# Patient Record
Sex: MALE | Race: OTHER | HISPANIC OR LATINO | URBAN - METROPOLITAN AREA
[De-identification: names, ages, dates, MRNs, and addresses within clinical notes are randomized per-mention and may not be internally consistent; named-entity substitution may affect disease eponyms.]

---

## 2017-09-13 ENCOUNTER — OUTPATIENT (OUTPATIENT)
Dept: OUTPATIENT SERVICES | Facility: HOSPITAL | Age: 47
LOS: 1 days | End: 2017-09-13
Payer: SELF-PAY

## 2017-09-13 ENCOUNTER — APPOINTMENT (OUTPATIENT)
Dept: FAMILY MEDICINE | Facility: HOSPITAL | Age: 47
End: 2017-09-13

## 2017-09-13 ENCOUNTER — MED ADMIN CHARGE (OUTPATIENT)
Age: 47
End: 2017-09-13

## 2017-09-13 VITALS
WEIGHT: 174 LBS | BODY MASS INDEX: 27.25 KG/M2 | HEART RATE: 63 BPM | RESPIRATION RATE: 16 BRPM | SYSTOLIC BLOOD PRESSURE: 133 MMHG | DIASTOLIC BLOOD PRESSURE: 88 MMHG | OXYGEN SATURATION: 97 % | TEMPERATURE: 97 F

## 2017-09-13 PROCEDURE — G0463: CPT

## 2017-09-19 ENCOUNTER — OUTPATIENT (OUTPATIENT)
Dept: OUTPATIENT SERVICES | Facility: HOSPITAL | Age: 47
LOS: 1 days | End: 2017-09-19
Payer: SELF-PAY

## 2017-09-19 PROCEDURE — 80061 LIPID PANEL: CPT

## 2017-09-19 PROCEDURE — 80053 COMPREHEN METABOLIC PANEL: CPT

## 2017-09-19 PROCEDURE — 36415 COLL VENOUS BLD VENIPUNCTURE: CPT

## 2017-09-19 PROCEDURE — 85025 COMPLETE CBC W/AUTO DIFF WBC: CPT

## 2017-09-19 PROCEDURE — 83036 HEMOGLOBIN GLYCOSYLATED A1C: CPT

## 2017-09-19 PROCEDURE — 84439 ASSAY OF FREE THYROXINE: CPT

## 2017-09-19 PROCEDURE — 84443 ASSAY THYROID STIM HORMONE: CPT

## 2017-10-20 ENCOUNTER — OUTPATIENT (OUTPATIENT)
Dept: OUTPATIENT SERVICES | Facility: HOSPITAL | Age: 47
LOS: 1 days | End: 2017-10-20
Payer: SELF-PAY

## 2017-10-20 ENCOUNTER — APPOINTMENT (OUTPATIENT)
Dept: FAMILY MEDICINE | Facility: HOSPITAL | Age: 47
End: 2017-10-20

## 2017-10-20 VITALS
HEART RATE: 89 BPM | DIASTOLIC BLOOD PRESSURE: 87 MMHG | RESPIRATION RATE: 16 BRPM | OXYGEN SATURATION: 97 % | SYSTOLIC BLOOD PRESSURE: 129 MMHG | WEIGHT: 183 LBS | TEMPERATURE: 98.5 F | BODY MASS INDEX: 28.66 KG/M2

## 2017-10-20 PROCEDURE — G0463: CPT

## 2018-01-11 ENCOUNTER — OUTPATIENT (OUTPATIENT)
Dept: OUTPATIENT SERVICES | Facility: HOSPITAL | Age: 48
LOS: 1 days | End: 2018-01-11
Payer: SELF-PAY

## 2018-01-11 ENCOUNTER — APPOINTMENT (OUTPATIENT)
Dept: FAMILY MEDICINE | Facility: HOSPITAL | Age: 48
End: 2018-01-11

## 2018-01-11 VITALS
HEART RATE: 72 BPM | SYSTOLIC BLOOD PRESSURE: 130 MMHG | HEIGHT: 67 IN | OXYGEN SATURATION: 97 % | WEIGHT: 180 LBS | DIASTOLIC BLOOD PRESSURE: 84 MMHG | TEMPERATURE: 98.1 F | BODY MASS INDEX: 28.25 KG/M2 | RESPIRATION RATE: 16 BRPM

## 2018-01-11 DIAGNOSIS — Z00.00 ENCOUNTER FOR GENERAL ADULT MEDICAL EXAMINATION WITHOUT ABNORMAL FINDINGS: ICD-10-CM

## 2018-01-11 PROCEDURE — G0463: CPT

## 2018-01-12 DIAGNOSIS — R19.6 HALITOSIS: ICD-10-CM

## 2018-03-13 ENCOUNTER — OUTPATIENT (OUTPATIENT)
Dept: OUTPATIENT SERVICES | Facility: HOSPITAL | Age: 48
LOS: 1 days | End: 2018-03-13
Payer: SELF-PAY

## 2018-03-13 ENCOUNTER — APPOINTMENT (OUTPATIENT)
Dept: GASTROENTEROLOGY | Facility: HOSPITAL | Age: 48
End: 2018-03-13

## 2018-03-13 VITALS
SYSTOLIC BLOOD PRESSURE: 137 MMHG | DIASTOLIC BLOOD PRESSURE: 93 MMHG | WEIGHT: 180 LBS | HEIGHT: 67 IN | BODY MASS INDEX: 28.25 KG/M2 | HEART RATE: 72 BPM

## 2018-03-13 DIAGNOSIS — K31.9 DISEASE OF STOMACH AND DUODENUM, UNSPECIFIED: ICD-10-CM

## 2018-03-13 DIAGNOSIS — Z00.00 ENCOUNTER FOR GENERAL ADULT MEDICAL EXAMINATION WITHOUT ABNORMAL FINDINGS: ICD-10-CM

## 2018-03-13 DIAGNOSIS — R74.0 NONSPECIFIC ELEVATION OF LEVELS OF TRANSAMINASE AND LACTIC ACID DEHYDROGENASE [LDH]: ICD-10-CM

## 2018-03-13 DIAGNOSIS — R19.6 HALITOSIS: ICD-10-CM

## 2018-03-13 PROCEDURE — G0463: CPT

## 2018-03-13 RX ORDER — RANITIDINE HYDROCHLORIDE 150 MG/1
150 CAPSULE ORAL
Qty: 7 | Refills: 0 | Status: DISCONTINUED | COMMUNITY
Start: 2018-01-11 | End: 2018-03-13

## 2018-07-02 ENCOUNTER — APPOINTMENT (OUTPATIENT)
Dept: OTOLARYNGOLOGY | Facility: CLINIC | Age: 48
End: 2018-07-02

## 2018-07-18 ENCOUNTER — RESULT REVIEW (OUTPATIENT)
Age: 48
End: 2018-07-18

## 2018-07-18 ENCOUNTER — OUTPATIENT (OUTPATIENT)
Dept: OUTPATIENT SERVICES | Facility: HOSPITAL | Age: 48
LOS: 1 days | End: 2018-07-18
Payer: SELF-PAY

## 2018-07-18 DIAGNOSIS — Z00.00 ENCOUNTER FOR GENERAL ADULT MEDICAL EXAMINATION WITHOUT ABNORMAL FINDINGS: ICD-10-CM

## 2018-07-18 PROCEDURE — 45380 COLONOSCOPY AND BIOPSY: CPT | Mod: PT

## 2018-07-18 PROCEDURE — 43239 EGD BIOPSY SINGLE/MULTIPLE: CPT

## 2018-07-18 PROCEDURE — 88312 SPECIAL STAINS GROUP 1: CPT

## 2018-07-18 PROCEDURE — 45380 COLONOSCOPY AND BIOPSY: CPT

## 2018-07-18 PROCEDURE — 88305 TISSUE EXAM BY PATHOLOGIST: CPT

## 2018-07-18 PROCEDURE — 88312 SPECIAL STAINS GROUP 1: CPT | Mod: 26

## 2018-07-18 PROCEDURE — 88305 TISSUE EXAM BY PATHOLOGIST: CPT | Mod: 26

## 2018-07-30 ENCOUNTER — OUTPATIENT (OUTPATIENT)
Dept: OUTPATIENT SERVICES | Facility: HOSPITAL | Age: 48
LOS: 1 days | Discharge: HOME | End: 2018-07-30

## 2018-07-30 ENCOUNTER — APPOINTMENT (OUTPATIENT)
Dept: OTOLARYNGOLOGY | Facility: CLINIC | Age: 48
End: 2018-07-30
Payer: COMMERCIAL

## 2018-07-30 VITALS
WEIGHT: 178 LBS | SYSTOLIC BLOOD PRESSURE: 118 MMHG | HEIGHT: 67 IN | BODY MASS INDEX: 27.94 KG/M2 | DIASTOLIC BLOOD PRESSURE: 77 MMHG | HEART RATE: 73 BPM

## 2018-07-30 PROCEDURE — 99204 OFFICE O/P NEW MOD 45 MIN: CPT

## 2018-10-11 ENCOUNTER — OUTPATIENT (OUTPATIENT)
Dept: OUTPATIENT SERVICES | Facility: HOSPITAL | Age: 48
LOS: 1 days | End: 2018-10-11
Payer: SELF-PAY

## 2018-10-11 ENCOUNTER — APPOINTMENT (OUTPATIENT)
Dept: FAMILY MEDICINE | Facility: HOSPITAL | Age: 48
End: 2018-10-11

## 2018-10-11 ENCOUNTER — CHART COPY (OUTPATIENT)
Age: 48
End: 2018-10-11

## 2018-10-11 VITALS
BODY MASS INDEX: 34.95 KG/M2 | TEMPERATURE: 97.2 F | WEIGHT: 178 LBS | OXYGEN SATURATION: 67 % | SYSTOLIC BLOOD PRESSURE: 120 MMHG | HEIGHT: 60 IN | RESPIRATION RATE: 16 BRPM | HEART RATE: 74 BPM | DIASTOLIC BLOOD PRESSURE: 78 MMHG

## 2018-10-11 DIAGNOSIS — Z00.00 ENCOUNTER FOR GENERAL ADULT MEDICAL EXAMINATION WITHOUT ABNORMAL FINDINGS: ICD-10-CM

## 2018-10-11 PROCEDURE — G0463: CPT

## 2018-10-11 NOTE — HISTORY OF PRESENT ILLNESS
[FreeTextEntry8] :  ID: [id number]\par 48 yrs F comes with CC of the Halitosis follow up .He continues to have bad breath ,not daily on and off .He was seen by GI and ENT unsure what was there recommendations .He tried all medication prescribed by the specialist nothing helped .No abdominal pain ,N/V/D/C ,weight loss ,bad tooth ,eats on and off garlic and not daily .

## 2018-10-12 DIAGNOSIS — R19.6 HALITOSIS: ICD-10-CM

## 2018-10-15 ENCOUNTER — APPOINTMENT (OUTPATIENT)
Dept: OTOLARYNGOLOGY | Facility: CLINIC | Age: 48
End: 2018-10-15
Payer: COMMERCIAL

## 2018-10-15 VITALS — SYSTOLIC BLOOD PRESSURE: 129 MMHG | HEART RATE: 73 BPM | WEIGHT: 178 LBS | DIASTOLIC BLOOD PRESSURE: 88 MMHG

## 2018-10-15 PROCEDURE — 99213 OFFICE O/P EST LOW 20 MIN: CPT

## 2018-10-18 DIAGNOSIS — R19.6 HALITOSIS: ICD-10-CM

## 2018-10-18 DIAGNOSIS — R09.81 NASAL CONGESTION: ICD-10-CM

## 2018-10-18 DIAGNOSIS — F45.8 OTHER SOMATOFORM DISORDERS: ICD-10-CM

## 2019-02-11 ENCOUNTER — OUTPATIENT (OUTPATIENT)
Dept: OUTPATIENT SERVICES | Facility: HOSPITAL | Age: 49
LOS: 1 days | Discharge: HOME | End: 2019-02-11

## 2019-02-11 ENCOUNTER — APPOINTMENT (OUTPATIENT)
Dept: OTOLARYNGOLOGY | Facility: CLINIC | Age: 49
End: 2019-02-11
Payer: COMMERCIAL

## 2019-02-11 VITALS
DIASTOLIC BLOOD PRESSURE: 82 MMHG | SYSTOLIC BLOOD PRESSURE: 131 MMHG | HEIGHT: 65.75 IN | BODY MASS INDEX: 28.46 KG/M2 | WEIGHT: 175 LBS | HEART RATE: 79 BPM

## 2019-02-11 PROCEDURE — 99213 OFFICE O/P EST LOW 20 MIN: CPT

## 2019-02-11 RX ORDER — DOCUSATE SODIUM 100 MG/1
100 CAPSULE ORAL TWICE DAILY
Qty: 30 | Refills: 0 | Status: DISCONTINUED | COMMUNITY
Start: 2017-09-13 | End: 2019-02-11

## 2019-02-11 RX ORDER — POLYETHYLENE GLYCOL 3350 17 G/17G
17 POWDER, FOR SOLUTION ORAL TWICE DAILY
Qty: 1 | Refills: 0 | Status: DISCONTINUED | COMMUNITY
Start: 2017-09-13 | End: 2019-02-11

## 2019-02-11 RX ORDER — FLUTICASONE FUROATE 27.5 UG/1
27.5 SPRAY, METERED NASAL DAILY
Qty: 1 | Refills: 0 | Status: DISCONTINUED | COMMUNITY
Start: 2017-10-20 | End: 2019-02-11

## 2019-02-11 RX ORDER — FLUTICASONE PROPIONATE 50 UG/1
50 SPRAY, METERED NASAL DAILY
Qty: 1 | Refills: 2 | Status: DISCONTINUED | COMMUNITY
Start: 2018-07-30 | End: 2019-02-11

## 2019-02-11 RX ORDER — RANITIDINE 300 MG/1
300 TABLET ORAL DAILY
Qty: 30 | Refills: 2 | Status: DISCONTINUED | COMMUNITY
Start: 2018-07-30 | End: 2019-02-11

## 2019-02-11 RX ORDER — PANTOPRAZOLE 40 MG/1
40 TABLET, DELAYED RELEASE ORAL DAILY
Qty: 30 | Refills: 2 | Status: DISCONTINUED | COMMUNITY
Start: 2018-07-30 | End: 2019-02-11

## 2019-02-11 NOTE — REASON FOR VISIT
[Subsequent Evaluation] : a subsequent evaluation for [Family Member] : family member [Patient Declined  Services] : - None: Patient declined  services [FreeTextEntry2] : Luis Miguel [FreeTextEntry3] : Patient declined offer of translation service.  Patient preferred to use accompanying family member/friend for translation.

## 2019-02-11 NOTE — PHYSICAL EXAM
[Midline] : trachea located in midline position [Normal] : orientation to person, place, and time: normal

## 2019-02-11 NOTE — HISTORY OF PRESENT ILLNESS
[de-identified] : 48 year old male follow up for nasal congestion.  Last seen Oct 2018 for nasal congestion and reflux symptoms. No longer on flonase or PPI/antihistamine.\par \par Denies nasal congestion, sinus pain, sinus pressure.  Patient report intermittent clear sputum, but denies throat pain.  No sinus infections since last visit. \par \par Reports minimal halitosis

## 2019-02-21 DIAGNOSIS — F45.8 OTHER SOMATOFORM DISORDERS: ICD-10-CM

## 2019-02-21 DIAGNOSIS — R19.6 HALITOSIS: ICD-10-CM

## 2019-03-16 ENCOUNTER — EMERGENCY (EMERGENCY)
Facility: HOSPITAL | Age: 49
LOS: 1 days | Discharge: HOME | End: 2019-03-16
Attending: INTERNAL MEDICINE | Admitting: INTERNAL MEDICINE
Payer: MEDICAID

## 2019-03-16 VITALS
RESPIRATION RATE: 16 BRPM | HEART RATE: 73 BPM | TEMPERATURE: 98 F | HEIGHT: 64.96 IN | SYSTOLIC BLOOD PRESSURE: 149 MMHG | WEIGHT: 184.97 LBS | OXYGEN SATURATION: 97 % | DIASTOLIC BLOOD PRESSURE: 92 MMHG

## 2019-03-16 DIAGNOSIS — H92.09 OTALGIA, UNSPECIFIED EAR: ICD-10-CM

## 2019-03-16 LAB
ALBUMIN SERPL ELPH-MCNC: 3.9 G/DL — SIGNIFICANT CHANGE UP (ref 3.3–5)
ALP SERPL-CCNC: 75 U/L — SIGNIFICANT CHANGE UP (ref 40–120)
ALT FLD-CCNC: 52 U/L DA — HIGH (ref 10–45)
ANION GAP SERPL CALC-SCNC: 10 MMOL/L — SIGNIFICANT CHANGE UP (ref 5–17)
AST SERPL-CCNC: 29 U/L — SIGNIFICANT CHANGE UP (ref 10–40)
BASOPHILS # BLD AUTO: 0.1 K/UL — SIGNIFICANT CHANGE UP (ref 0–0.2)
BASOPHILS NFR BLD AUTO: 1 % — SIGNIFICANT CHANGE UP (ref 0–2)
BILIRUB SERPL-MCNC: 0.3 MG/DL — SIGNIFICANT CHANGE UP (ref 0.2–1.2)
BUN SERPL-MCNC: 15 MG/DL — SIGNIFICANT CHANGE UP (ref 7–23)
CALCIUM SERPL-MCNC: 9.1 MG/DL — SIGNIFICANT CHANGE UP (ref 8.4–10.5)
CHLORIDE SERPL-SCNC: 106 MMOL/L — SIGNIFICANT CHANGE UP (ref 96–108)
CO2 SERPL-SCNC: 25 MMOL/L — SIGNIFICANT CHANGE UP (ref 22–31)
CREAT SERPL-MCNC: 1.07 MG/DL — SIGNIFICANT CHANGE UP (ref 0.5–1.3)
EOSINOPHIL # BLD AUTO: 0.2 K/UL — SIGNIFICANT CHANGE UP (ref 0–0.5)
EOSINOPHIL NFR BLD AUTO: 2.9 % — SIGNIFICANT CHANGE UP (ref 0–6)
GLUCOSE SERPL-MCNC: 114 MG/DL — HIGH (ref 70–99)
HCT VFR BLD CALC: 47.1 % — SIGNIFICANT CHANGE UP (ref 39–50)
HGB BLD-MCNC: 16.1 G/DL — SIGNIFICANT CHANGE UP (ref 13–17)
LYMPHOCYTES # BLD AUTO: 1.8 K/UL — SIGNIFICANT CHANGE UP (ref 1–3.3)
LYMPHOCYTES # BLD AUTO: 28.7 % — SIGNIFICANT CHANGE UP (ref 13–44)
MCHC RBC-ENTMCNC: 31.8 PG — SIGNIFICANT CHANGE UP (ref 27–34)
MCHC RBC-ENTMCNC: 34.2 GM/DL — SIGNIFICANT CHANGE UP (ref 32–36)
MCV RBC AUTO: 93.1 FL — SIGNIFICANT CHANGE UP (ref 80–100)
MONOCYTES # BLD AUTO: 0.5 K/UL — SIGNIFICANT CHANGE UP (ref 0–0.9)
MONOCYTES NFR BLD AUTO: 8.7 % — SIGNIFICANT CHANGE UP (ref 1–9)
NEUTROPHILS # BLD AUTO: 3.7 K/UL — SIGNIFICANT CHANGE UP (ref 1.8–7.4)
NEUTROPHILS NFR BLD AUTO: 58.7 % — SIGNIFICANT CHANGE UP (ref 43–77)
PLATELET # BLD AUTO: 264 K/UL — SIGNIFICANT CHANGE UP (ref 150–400)
POTASSIUM SERPL-MCNC: 4.1 MMOL/L — SIGNIFICANT CHANGE UP (ref 3.5–5.3)
POTASSIUM SERPL-SCNC: 4.1 MMOL/L — SIGNIFICANT CHANGE UP (ref 3.5–5.3)
PROT SERPL-MCNC: 7.6 G/DL — SIGNIFICANT CHANGE UP (ref 6–8.3)
RBC # BLD: 5.06 M/UL — SIGNIFICANT CHANGE UP (ref 4.2–5.8)
RBC # FLD: 12 % — SIGNIFICANT CHANGE UP (ref 10.3–14.5)
SODIUM SERPL-SCNC: 141 MMOL/L — SIGNIFICANT CHANGE UP (ref 135–145)
WBC # BLD: 6.2 K/UL — SIGNIFICANT CHANGE UP (ref 3.8–10.5)
WBC # FLD AUTO: 6.2 K/UL — SIGNIFICANT CHANGE UP (ref 3.8–10.5)

## 2019-03-16 PROCEDURE — 96361 HYDRATE IV INFUSION ADD-ON: CPT

## 2019-03-16 PROCEDURE — 70480 CT ORBIT/EAR/FOSSA W/O DYE: CPT | Mod: 26

## 2019-03-16 PROCEDURE — 99284 EMERGENCY DEPT VISIT MOD MDM: CPT

## 2019-03-16 PROCEDURE — 99284 EMERGENCY DEPT VISIT MOD MDM: CPT | Mod: 25

## 2019-03-16 PROCEDURE — 96374 THER/PROPH/DIAG INJ IV PUSH: CPT

## 2019-03-16 PROCEDURE — 70480 CT ORBIT/EAR/FOSSA W/O DYE: CPT

## 2019-03-16 PROCEDURE — 85027 COMPLETE CBC AUTOMATED: CPT

## 2019-03-16 PROCEDURE — 80053 COMPREHEN METABOLIC PANEL: CPT

## 2019-03-16 RX ORDER — LEVOFLOXACIN 5 MG/ML
1 INJECTION, SOLUTION INTRAVENOUS
Qty: 10 | Refills: 0
Start: 2019-03-16 | End: 2019-03-25

## 2019-03-16 RX ORDER — SODIUM CHLORIDE 9 MG/ML
1000 INJECTION INTRAMUSCULAR; INTRAVENOUS; SUBCUTANEOUS ONCE
Qty: 0 | Refills: 0 | Status: COMPLETED | OUTPATIENT
Start: 2019-03-16 | End: 2019-03-16

## 2019-03-16 RX ORDER — CIPROFLOXACIN AND DEXAMETHASONE 3; 1 MG/ML; MG/ML
3 SUSPENSION/ DROPS AURICULAR (OTIC) ONCE
Qty: 0 | Refills: 0 | Status: DISCONTINUED | OUTPATIENT
Start: 2019-03-16 | End: 2019-03-16

## 2019-03-16 RX ORDER — CIPROFLOXACIN HCL 0.3 %
1 DROPS OPHTHALMIC (EYE) ONCE
Qty: 0 | Refills: 0 | Status: COMPLETED | OUTPATIENT
Start: 2019-03-16 | End: 2019-03-16

## 2019-03-16 RX ORDER — DEXAMETHASONE 0.4 MG/1
1 INSERT INTRACANALICULAR; OPHTHALMIC ONCE
Qty: 0 | Refills: 0 | Status: COMPLETED | OUTPATIENT
Start: 2019-03-16 | End: 2019-03-16

## 2019-03-16 RX ADMIN — SODIUM CHLORIDE 1000 MILLILITER(S): 9 INJECTION INTRAMUSCULAR; INTRAVENOUS; SUBCUTANEOUS at 16:12

## 2019-03-16 RX ADMIN — SODIUM CHLORIDE 2000 MILLILITER(S): 9 INJECTION INTRAMUSCULAR; INTRAVENOUS; SUBCUTANEOUS at 15:02

## 2019-03-16 RX ADMIN — DEXAMETHASONE 1 DROP(S): 0.4 INSERT INTRACANALICULAR; OPHTHALMIC at 17:20

## 2019-03-16 RX ADMIN — Medication 1 DROP(S): at 17:20

## 2019-03-16 NOTE — ED PROVIDER NOTE - ATTENDING CONTRIBUTION TO CARE
· HPI Objective Statement: pt 49 yo m c/o right ear pain for 2 weeks. took po abx and ear drops for 10 days but ear has been worsening. pain is intermittent and sharp. difficulty hearing  	denies headache, fever, chills, vision changes, n/v, abd pain, sore throat    	attempted to use  phone multiple times and pt would like daughter to translate    pe R ear canal + exudate perforated TM   minimal R mastoid tenderness moderate R ear decreased hearing  neck supple no meningeal signs , pt looks non toxic  Dr. Rinaldi:  I have reviewed and discussed with the PA/ resident the case specifics, including the history, physical assessment, evaluation, conclusion, laboratory results, and medical plan. I agree with the contents, and conclusions. I have personally examined, and interviewed the patient.

## 2019-03-16 NOTE — ED ADULT NURSE NOTE - OBJECTIVE STATEMENT
pt complaining of right ear pain since the first week of march. pt was prescribed abx ear drops w/ no relief in pain. pt complaining of 6/10 outer ear and periaucilar ear pain. pt reports decrease in hearing over the past week. right ear with pusy drainage. pt denies fever or chills.

## 2019-03-16 NOTE — ED PROVIDER NOTE - PHYSICAL EXAMINATION
General:     NAD, well-nourished, well-appearing  Eyes: PERRL  Head:     mild tenderness right mastoid  Ear: Right canal swelling, discharge, TTP, pain with tragu palpation and mvt of pinna  Neck:     trachea midline  Lungs:     CTA b/l  CVS:     RRR  Abd:     +BS, s/nt/nd  Ext:   no deformities   Neuro: AAOx3, no sensory/motor deficits

## 2019-03-16 NOTE — ED PROVIDER NOTE - NSFOLLOWUPCLINICS_GEN_ALL_ED_FT
[facility] ENT  ENT  [address], [address]  [place] [address]  Phone: [phone]  Fax:   Follow Up Time:

## 2019-03-16 NOTE — ED ADULT NURSE NOTE - CHIEF COMPLAINT QUOTE
Right ear pain started about 2 weeks ago, not responding to ear drops prescribed by [employer] MD

## 2019-03-16 NOTE — ED PROVIDER NOTE - OBJECTIVE STATEMENT
pt 47 yo m c/o right ear pain for 2 weeks. took po abx and ear drops for 10 days but ear has been worsening. pain is intermittent and sharp. difficulty hearing  denies headache, fever, chills, vision changes, n/v, abd pain, sore throat    attempted to use  phone multiple times and pt would like daughter to translate

## 2019-03-16 NOTE — ED PROVIDER NOTE - PROGRESS NOTE DETAILS
ear wick placed in ear with cipro and dexamethasone. pt to f/u ENT discharge instructions given to pt in [place]

## 2019-03-16 NOTE — ED PROVIDER NOTE - NSFOLLOWUPINSTRUCTIONS_ED_ALL_ED_FT
Follow up with the ENT this week. If you cannot get an appointment follow up with your primary care provider in 48 hours  Take the antibiotics as directed with food  Use the Cipro ear drops and the dexamethasone ear drops 4 drops of each twice a day for 1 week  Any worsening of symptoms or new concerning symptoms return to the ED

## 2019-03-25 ENCOUNTER — APPOINTMENT (OUTPATIENT)
Dept: OTOLARYNGOLOGY | Facility: CLINIC | Age: 49
End: 2019-03-25
Payer: COMMERCIAL

## 2019-03-25 VITALS
BODY MASS INDEX: 28.12 KG/M2 | WEIGHT: 175 LBS | HEIGHT: 66 IN | DIASTOLIC BLOOD PRESSURE: 81 MMHG | SYSTOLIC BLOOD PRESSURE: 120 MMHG | HEART RATE: 80 BPM

## 2019-03-25 PROCEDURE — 99214 OFFICE O/P EST MOD 30 MIN: CPT | Mod: 25

## 2019-03-25 PROCEDURE — 69210 REMOVE IMPACTED EAR WAX UNI: CPT

## 2019-03-26 NOTE — PROCEDURE
[] : Removal of Cerumen [FreeTextEntry2] : Right ear with creamy white exudate completely filling the right EAC, removed with suction. Minimal amount of creamy white exudate adherent to TM, unable to remove with suction. Minimal canal edema/erythema noted. No need for otowick placement. [FreeTextEntry6] : Right ear with creamy white exudate completely filling the right EAC, removed with suction. Minimal amount of creamy white exudate adherent to TM, unable to remove with suction. Minimal canal edema/erythema noted. No need for otowick placement.

## 2019-03-26 NOTE — HISTORY OF PRESENT ILLNESS
[de-identified] : 48M presenting with right ear pain x 1 month. Reports right ear throbbing. Seen in ED 1 week ago and prescribed ciprodex drops and levofloxacin without improvement. Denies otorrhea, tinnitus, hearing changes, vertigo, hx ear surgeries. Hx 1 ear infection 7 years ago.

## 2019-03-26 NOTE — PHYSICAL EXAM
[Normal] : mucosa is normal [Midline] : trachea located in midline position [de-identified] : left EAC within normal limits, see microscopy section for findings for right ear [de-identified] : left TM intact without effusion, see microscopy section for right ear findings

## 2019-03-26 NOTE — REASON FOR VISIT
[Subsequent Evaluation] : a subsequent evaluation for [Family Member] : family member [Patient Declined  Services] : - None: Patient declined  services [Source: ______] : History obtained from [unfilled] [FreeTextEntry2] : Karlee (daughter) [FreeTextEntry3] : Patient declined offer of translation service.  Patient preferred to use accompanying family member/friend for translation.

## 2019-04-01 ENCOUNTER — APPOINTMENT (OUTPATIENT)
Dept: OTOLARYNGOLOGY | Facility: CLINIC | Age: 49
End: 2019-04-01

## 2019-04-01 DIAGNOSIS — H60.311 DIFFUSE OTITIS EXTERNA, RIGHT EAR: ICD-10-CM

## 2019-04-08 ENCOUNTER — APPOINTMENT (OUTPATIENT)
Dept: OTOLARYNGOLOGY | Facility: CLINIC | Age: 49
End: 2019-04-08
Payer: COMMERCIAL

## 2019-04-08 VITALS
HEART RATE: 86 BPM | BODY MASS INDEX: 28.12 KG/M2 | HEIGHT: 66 IN | DIASTOLIC BLOOD PRESSURE: 86 MMHG | WEIGHT: 175 LBS | SYSTOLIC BLOOD PRESSURE: 129 MMHG

## 2019-04-08 PROCEDURE — 69210 REMOVE IMPACTED EAR WAX UNI: CPT

## 2019-04-08 PROCEDURE — 99213 OFFICE O/P EST LOW 20 MIN: CPT | Mod: 25

## 2019-04-08 RX ORDER — CIPROFLOXACIN AND DEXAMETHASONE 3; 1 MG/ML; MG/ML
0.3-0.1 SUSPENSION/ DROPS AURICULAR (OTIC) TWICE DAILY
Qty: 1 | Refills: 0 | Status: DISCONTINUED | COMMUNITY
Start: 2019-03-25 | End: 2019-04-08

## 2019-04-08 RX ORDER — LEVOFLOXACIN 750 MG/1
TABLET, FILM COATED ORAL
Refills: 0 | Status: DISCONTINUED | COMMUNITY
End: 2019-04-08

## 2019-04-08 NOTE — REASON FOR VISIT
[Subsequent Evaluation] : a subsequent evaluation for [Family Member] : family member [Patient Declined  Services] : - None: Patient declined  services [Source: ______] : History obtained from [unfilled] [FreeTextEntry2] : Karlee [FreeTextEntry3] : Patient declined offer of translation service.  Patient preferred to use accompanying family member/friend for translation.

## 2019-04-08 NOTE — PHYSICAL EXAM
[Normal] : assessment of respiratory effort is normal [de-identified] : right ear canal filled with white foamy debris

## 2019-04-08 NOTE — HISTORY OF PRESENT ILLNESS
[de-identified] : 48 year old male presents for follow up of right otitis externa. Patient reports pain has improved. Still has mild sensation of something pulling in ear. Has been using ear drops.

## 2019-04-11 DIAGNOSIS — H61.20 IMPACTED CERUMEN, UNSPECIFIED EAR: ICD-10-CM

## 2019-04-11 DIAGNOSIS — B36.9 SUPERFICIAL MYCOSIS, UNSPECIFIED: ICD-10-CM

## 2019-04-15 ENCOUNTER — APPOINTMENT (OUTPATIENT)
Dept: OTOLARYNGOLOGY | Facility: CLINIC | Age: 49
End: 2019-04-15
Payer: COMMERCIAL

## 2019-04-15 PROCEDURE — 92504 EAR MICROSCOPY EXAMINATION: CPT

## 2019-04-15 PROCEDURE — 99213 OFFICE O/P EST LOW 20 MIN: CPT | Mod: 25

## 2019-04-15 RX ORDER — CIPROFLOXACIN 3 MG/ML
0.3 SOLUTION OPHTHALMIC
Refills: 0 | Status: DISCONTINUED | COMMUNITY
End: 2019-04-15

## 2019-04-15 RX ORDER — CIPROFLOXACIN AND DEXAMETHASONE 3; 1 MG/ML; MG/ML
0.3-0.1 SUSPENSION/ DROPS AURICULAR (OTIC) TWICE DAILY
Qty: 1 | Refills: 0 | Status: DISCONTINUED | COMMUNITY
Start: 2019-03-30 | End: 2019-04-15

## 2019-04-15 NOTE — HISTORY OF PRESENT ILLNESS
[de-identified] : 48 year old male follow up for Right Otitis externa with fungal appearance, s/p debridement and clotrimazole prescribed.  Patient states he is doing well.  Reports no complaints/issues with ears, uses cream as prescribed

## 2019-04-15 NOTE — REASON FOR VISIT
[Subsequent Evaluation] : a subsequent evaluation for [Family Member] : family member [Patient Declined  Services] : - None: Patient declined  services [Source: ______] : History obtained from [unfilled] [FreeTextEntry2] : Karlee (daughter) [FreeTextEntry3] : Patient refused  services, requested to use accompanying family member

## 2019-06-10 NOTE — ED PROVIDER NOTE - MDM ORDERS SUBMITTED SELECTION
Order faxed   ----- Message from Morelia Brandt sent at 6/10/2019 10:55 AM EDT -----  SAYS HE CALLED [facility] AND NEEDED LAB ORDERS SET TO [facility] SO SHE CAN GET HER LABS [facility]   [facility] SAYS THEY NEVER RECEIVED  THE ORDERS  PLEASE SEND TO [facility]     PLEASE CALL PATIENT   WITH ANY QUESTIONS         [phone]    [facility]  [phone]   [phone]    
Labs

## 2019-06-17 ENCOUNTER — APPOINTMENT (OUTPATIENT)
Dept: OTOLARYNGOLOGY | Facility: CLINIC | Age: 49
End: 2019-06-17

## 2021-03-18 ENCOUNTER — APPOINTMENT (OUTPATIENT)
Dept: ORTHOPEDIC SURGERY | Facility: CLINIC | Age: 51
End: 2021-03-18
Payer: SELF-PAY

## 2021-03-18 DIAGNOSIS — S84.12XA INJURY OF PERONEAL NERVE AT LOWER LEG LEVEL, LEFT LEG, INITIAL ENCOUNTER: ICD-10-CM

## 2021-03-18 DIAGNOSIS — S86.302A UNSPECIFIED INJURY OF MUSCLE(S) AND TENDON(S) OF PERONEAL MUSCLE GROUP AT LOWER LEG LEVEL, LEFT LEG, INITIAL ENCOUNTER: ICD-10-CM

## 2021-03-18 PROCEDURE — 99203 OFFICE O/P NEW LOW 30 MIN: CPT

## 2021-03-18 PROCEDURE — 73590 X-RAY EXAM OF LOWER LEG: CPT | Mod: LT

## 2021-03-18 NOTE — DISCUSSION/SUMMARY
[de-identified] : The underlying pathophysiology was reviewed in great detail with the patient as well as the various treatment options, including ice, analgesics, NSAIDs, Physical therapy, steroid injections.\par \par A prescription was provided for a MRI of the left knee and an US of the left lower extremity to rule out peroneal nerve injury versus mass. Patient will discuss with family which study to proceed with due to financial implications. \par \par Activity modifications and restrictions were discussed. \par \par A prescription was provided for Gabapentin 300mg. Patient was advised to take this one time a day at bed time. \par \par FU once imaging results are obtained. \par \par All questions were answered, all alternatives discussed and the patient is in complete agreement with that plan. Follow-up appointment as instructed. Any issues and the patient will call or come in sooner.

## 2021-03-18 NOTE — PHYSICAL EXAM
[de-identified] : Right Lower Extremity\par o Knee :\par ¦ Inspection/Palpation : no tenderness to palpation, no swelling, no deformity\par ¦ Range of Motion : 0 - 120 degrees, no crepitus\par ¦ Stability : no valgus or varus instability present on provocative testing, Lachman’s Test (-)\par ¦ Strength : hip flexion 5/5, adduction 5/5, gluteus medius 5/5 \par o Muscle Bulk : normal muscle bulk present\par o Skin : no erythema, no ecchymosis\par o Sensation : sensation to pin intact\par o Vascular Exam : no edema, no cyanosis, dorsalis pedis artery pulse 2+, posterior tibial artery pulse 2+\par \par Left Lower Extremity\par o Knee :\par ¦ Inspection/Palpation : proximal peroneal tendon tenderness to palpation, no swelling, no deformity\par ¦ Range of Motion : 0 -120 degrees, no crepitus\par ¦ Stability : no valgus or varus instability present on provocative testing, Lachman’s Test (-)\par ¦ Strength : hip flexion 5/5 \par o Muscle Bulk : normal muscle bulk present\par o Skin : no erythema, no ecchymosis\par o Sensation : sensation to pin intact\par o Vascular Exam : no edema, no cyanosis, dorsalis pedis artery pulse 2+, posterior tibial artery pulse 2+ \par \par o Ankle :\par ¦ Inspection/Palpation : no tenderness to palpation, no swelling, no deformities\par ¦ Range of Motion : arc of motion full and painless in all planes with the exception of pain during inversion\par ¦ Stability : no joint instability on provocative testing\par ¦ Strength : all muscles 5/5  pain with resisted inversion. \par o Muscle Bulk : no atrophy\par o Sensation : sensation intact to light touch\par o Skin : no skin lesions, no discoloration\par o Vascular Exam : no edema, no cyanosis,  posterior tibialis and dorsalis pedis pulses normal \par \par o Foot:\par ¦ Inspection/Palpation : no tenderness to palpation, no swelling\par ¦ Range of Motion : arc of motion full and painless in all planes\par ¦ Stability : no joint instability on provocative testing\par ¦ Strength : all muscles 5/5 with the exception of EHL 4+/5. (right EHL 5/5)\par o Muscle Bulk : no atrophy\par o Sensation : dull sensation first web space.\par o Skin : no skin lesions, no discoloration\par o Vascular Exam : no edema, no cyanosis, dorsalis pedis and posterior tibial pulses normal \par \par  [de-identified] : o Left Tibia and Fibula: AP, and lateral views of the tibia and fibula were obtained, there are no soft tissue abnormalities, no fractures, alignment is normal, normal appearing joint spaces, normal bone density, \par \par  ******

## 2021-03-18 NOTE — CONSULT LETTER
[Dear  ___] : Dear  [unfilled], [Consult Letter:] : I had the pleasure of evaluating your patient, [unfilled]. [Please see my note below.] : Please see my note below. [Consult Closing:] : Thank you very much for allowing me to participate in the care of this patient.  If you have any questions, please do not hesitate to contact me. [Sincerely,] : Sincerely, [FreeTextEntry3] : Dr. Hossein Summers \par \par

## 2021-03-18 NOTE — HISTORY OF PRESENT ILLNESS
[de-identified] : CORKY WILSON is a 50 year male presenting to the office complaining of left leg pain. Patients primary language is [place]ican. He  presents to the office ambulating independently. Patient reports pain began in approximately 2018. Denies injury or trauma to the area.  The patient describes the pain as a dull aching, and occasionally sharp pain localized to the lateral aspect of his   proximal left lower leg that is intermittent in nature. His   symptoms are exacerbated with walking and at night.  Pain is alleviated with rest. Patient notes  Patient denies instability, catching or locking of the knee or ankle. He notes intermittent swelling, and increased warmth to the area. He cannot attribute any one activity or action that causes this. He also notes intermittent numbness of the foot. \par Patient is taking NSAIDs or Tylenol for pain relief with mild to moderate relief in symptoms. \par Patient denies any other complaints at this time.

## 2021-03-24 ENCOUNTER — OUTPATIENT (OUTPATIENT)
Dept: OUTPATIENT SERVICES | Facility: HOSPITAL | Age: 51
LOS: 1 days | End: 2021-03-24
Payer: SELF-PAY

## 2021-03-24 ENCOUNTER — APPOINTMENT (OUTPATIENT)
Dept: FAMILY MEDICINE | Facility: HOSPITAL | Age: 51
End: 2021-03-24

## 2021-03-24 VITALS
DIASTOLIC BLOOD PRESSURE: 83 MMHG | RESPIRATION RATE: 16 BRPM | OXYGEN SATURATION: 96 % | SYSTOLIC BLOOD PRESSURE: 126 MMHG | WEIGHT: 187 LBS | BODY MASS INDEX: 30.18 KG/M2 | HEART RATE: 79 BPM | TEMPERATURE: 98.6 F

## 2021-03-24 DIAGNOSIS — Z87.19 PERSONAL HISTORY OF OTHER DISEASES OF THE DIGESTIVE SYSTEM: ICD-10-CM

## 2021-03-24 DIAGNOSIS — Z00.00 ENCOUNTER FOR GENERAL ADULT MEDICAL EXAMINATION WITHOUT ABNORMAL FINDINGS: ICD-10-CM

## 2021-03-24 DIAGNOSIS — Z00.00 ENCOUNTER FOR GENERAL ADULT MEDICAL EXAMINATION W/OUT ABNORMAL FINDINGS: ICD-10-CM

## 2021-03-24 DIAGNOSIS — Z87.898 PERSONAL HISTORY OF OTHER SPECIFIED CONDITIONS: ICD-10-CM

## 2021-03-24 DIAGNOSIS — H60.312 DIFFUSE OTITIS EXTERNA, LEFT EAR: ICD-10-CM

## 2021-03-24 DIAGNOSIS — H62.40 SUPERFICIAL MYCOSIS, UNSPECIFIED: ICD-10-CM

## 2021-03-24 DIAGNOSIS — B36.9 SUPERFICIAL MYCOSIS, UNSPECIFIED: ICD-10-CM

## 2021-03-24 DIAGNOSIS — H60.311 DIFFUSE OTITIS EXTERNA, RIGHT EAR: ICD-10-CM

## 2021-03-24 DIAGNOSIS — Z87.09 PERSONAL HISTORY OF OTHER DISEASES OF THE RESPIRATORY SYSTEM: ICD-10-CM

## 2021-03-24 DIAGNOSIS — Z86.018 PERSONAL HISTORY OF OTHER BENIGN NEOPLASM: ICD-10-CM

## 2021-03-24 DIAGNOSIS — H60.503 UNSPECIFIED ACUTE NONINFECTIVE OTITIS EXTERNA, BILATERAL: ICD-10-CM

## 2021-03-24 PROCEDURE — 83036 HEMOGLOBIN GLYCOSYLATED A1C: CPT

## 2021-03-24 PROCEDURE — 84443 ASSAY THYROID STIM HORMONE: CPT

## 2021-03-24 PROCEDURE — 80061 LIPID PANEL: CPT

## 2021-03-24 PROCEDURE — G0463: CPT

## 2021-03-24 PROCEDURE — 80053 COMPREHEN METABOLIC PANEL: CPT

## 2021-03-24 PROCEDURE — 85025 COMPLETE CBC W/AUTO DIFF WBC: CPT

## 2021-03-24 PROCEDURE — 82306 VITAMIN D 25 HYDROXY: CPT

## 2021-03-24 RX ORDER — DEXAMETHASONE SODIUM PHOSPHATE 10 MG/ML
INJECTION, SOLUTION INTRAMUSCULAR; INTRAVENOUS
Refills: 0 | Status: DISCONTINUED | COMMUNITY
End: 2021-03-24

## 2021-03-24 RX ORDER — FLUTICASONE PROPIONATE 50 UG/1
50 SPRAY, METERED NASAL DAILY
Qty: 1 | Refills: 5 | Status: DISCONTINUED | COMMUNITY
Start: 2018-10-15 | End: 2021-03-24

## 2021-03-24 RX ORDER — GABAPENTIN 300 MG/1
300 CAPSULE ORAL
Qty: 30 | Refills: 0 | Status: DISCONTINUED | COMMUNITY
Start: 2021-03-18 | End: 2021-03-24

## 2021-03-25 DIAGNOSIS — E78.1 PURE HYPERGLYCERIDEMIA: ICD-10-CM

## 2021-03-25 DIAGNOSIS — R19.6 HALITOSIS: ICD-10-CM

## 2021-03-25 DIAGNOSIS — R09.89 OTHER SPECIFIED SYMPTOMS AND SIGNS INVOLVING THE CIRCULATORY AND RESPIRATORY SYSTEMS: ICD-10-CM

## 2021-03-25 NOTE — HISTORY OF PRESENT ILLNESS
[[employer] ] : provided by [employer]   [FreeTextEntry1] : [id number] [FreeTextEntry2] : Elías [TWNoteComboBox1] : Swedish [de-identified] : 49 yo M with hx of HLD and laryngopharyngeal reflux presents for CPE.\par Patient c/o chronic intermittent left shin pain (usually worse in the afternoon). Pt denies recent fall or trauma. \par Patient also reports chronic throat pain for years and halitosis occasionally along with globus sensation. Pt denies burning sensation.\par \par Social hx- works in [employer] , denies toxic habits, does not smoke or drink alcohol.

## 2021-03-25 NOTE — HEALTH RISK ASSESSMENT
[Good] : ~his/her~  mood as  good [No] : No [No falls in past year] : Patient reported no falls in the past year [0] : 2) Feeling down, depressed, or hopeless: Not at all (0) [HIV test declined] : HIV test declined [Hepatitis C test declined] : Hepatitis C test declined [Financial] : financial [With Significant Other] : lives with significant other [Employed] : employed [] :  [Sexually Active] : sexually active [Fully functional (bathing, dressing, toileting, transferring, walking, feeding)] : Fully functional (bathing, dressing, toileting, transferring, walking, feeding) [Smoke Detector] : smoke detector [Carbon Monoxide Detector] : carbon monoxide detector [Seat Belt] :  uses seat belt [] : No [Reports changes in hearing] : Reports no changes in hearing [Reports changes in vision] : Reports no changes in vision [ColonoscopyDate] : 07/18 [ColonoscopyComments] : one less than 5 mm polyp in cecum, resected and retrieved, non bleeding internal hemorrhoid; repeat colonoscopy within 1 year recommended because bowel prep was suboptimal  [FreeTextEntry2] :  [de-identified] : wears glasses

## 2021-03-25 NOTE — PLAN
[FreeTextEntry1] : \par \par \par \par \par #HM\par -CPE labs ordered\par -Pt refused flu shot today\par -Colonoscopy 2018 - 5 mm polyp (cecum), internal hemorrhoid, f/u in 1 year recommended; pt reports unable to afford another colonoscopy- discussed since patient lives in [place] and sliding scale patient, he may be eligible for CSP program\par \par #HLD\par -Lipid panel encouraged fasting\par -A1C ordered\par \par #Globus sensation\par -GI referral\par \par #Halitosis\par -Dental referral\par \par #Hx of laryngopharyngeal reflux\par -Chronic throat pain likely 2/2 laryngopharyngeal reflux  \par -Protonix 40 mg QD\par -F/u GI referral \par \par Case and plan d/w Dr. Meier\par \par F/u in 2 weeks

## 2021-03-25 NOTE — REVIEW OF SYSTEMS
[Sore Throat] : sore throat [Negative] : Heme/Lymph [Fever] : no fever [Chills] : no chills [Earache] : no earache [Hearing Loss] : no hearing loss [Nosebleeds] : no nosebleeds [Postnasal Drip] : no postnasal drip [Nasal Discharge] : no nasal discharge [Hoarseness] : no hoarseness [Joint Pain] : no joint pain [Joint Stiffness] : no joint stiffness [Muscle Pain] : no muscle pain [Muscle Weakness] : no muscle weakness [Back Pain] : no back pain [Joint Swelling] : no joint swelling [FreeTextEntry4] : + throat pain  [FreeTextEntry9] : + right shin pain

## 2021-03-25 NOTE — PHYSICAL EXAM
[No Acute Distress] : no acute distress [Well Nourished] : well nourished [Well Developed] : well developed [Well-Appearing] : well-appearing [Normal Sclera/Conjunctiva] : normal sclera/conjunctiva [PERRL] : pupils equal round and reactive to light [EOMI] : extraocular movements intact [Normal Outer Ear/Nose] : the outer ears and nose were normal in appearance [Normal Oropharynx] : the oropharynx was normal [No JVD] : no jugular venous distention [No Lymphadenopathy] : no lymphadenopathy [Supple] : supple [Thyroid Normal, No Nodules] : the thyroid was normal and there were no nodules present [No Respiratory Distress] : no respiratory distress  [No Accessory Muscle Use] : no accessory muscle use [Clear to Auscultation] : lungs were clear to auscultation bilaterally [Normal Rate] : normal rate  [Regular Rhythm] : with a regular rhythm [Normal S1, S2] : normal S1 and S2 [No Murmur] : no murmur heard [No Carotid Bruits] : no carotid bruits [No Abdominal Bruit] : a ~M bruit was not heard ~T in the abdomen [No Varicosities] : no varicosities [Pedal Pulses Present] : the pedal pulses are present [No Edema] : there was no peripheral edema [No Palpable Aorta] : no palpable aorta [No Extremity Clubbing/Cyanosis] : no extremity clubbing/cyanosis [Soft] : abdomen soft [Non Tender] : non-tender [Non-distended] : non-distended [No Masses] : no abdominal mass palpated [No HSM] : no HSM [Normal Bowel Sounds] : normal bowel sounds [Normal Posterior Cervical Nodes] : no posterior cervical lymphadenopathy [Normal Anterior Cervical Nodes] : no anterior cervical lymphadenopathy [No CVA Tenderness] : no CVA  tenderness [No Spinal Tenderness] : no spinal tenderness [No Joint Swelling] : no joint swelling [Grossly Normal Strength/Tone] : grossly normal strength/tone [No Rash] : no rash [Coordination Grossly Intact] : coordination grossly intact [No Focal Deficits] : no focal deficits [Normal Gait] : normal gait [Deep Tendon Reflexes (DTR)] : deep tendon reflexes were 2+ and symmetric [Normal Affect] : the affect was normal [Normal Insight/Judgement] : insight and judgment were intact [de-identified] : + left lower leg tenderness with palpation of lateral aspect; no calf tenderness, no erythema, no varicosity present

## 2021-03-30 ENCOUNTER — OUTPATIENT (OUTPATIENT)
Dept: OUTPATIENT SERVICES | Facility: HOSPITAL | Age: 51
LOS: 1 days | End: 2021-03-30

## 2021-03-30 DIAGNOSIS — E78.1 PURE HYPERGLYCERIDEMIA: ICD-10-CM

## 2021-03-30 DIAGNOSIS — Z00.00 ENCOUNTER FOR GENERAL ADULT MEDICAL EXAMINATION WITHOUT ABNORMAL FINDINGS: ICD-10-CM

## 2021-04-02 LAB
25(OH)D3 SERPL-MCNC: 23 NG/ML
ALBUMIN SERPL ELPH-MCNC: 4.7 G/DL
ALP BLD-CCNC: 75 U/L
ALT SERPL-CCNC: 51 U/L
ANION GAP SERPL CALC-SCNC: 13 MMOL/L
AST SERPL-CCNC: 50 U/L
BASOPHILS # BLD AUTO: 0.03 K/UL
BASOPHILS NFR BLD AUTO: 0.6 %
BILIRUB SERPL-MCNC: 0.5 MG/DL
BUN SERPL-MCNC: 14 MG/DL
CALCIUM SERPL-MCNC: 10.1 MG/DL
CHLORIDE SERPL-SCNC: 105 MMOL/L
CHOLEST SERPL-MCNC: 165 MG/DL
CO2 SERPL-SCNC: 22 MMOL/L
CREAT SERPL-MCNC: 0.81 MG/DL
EOSINOPHIL # BLD AUTO: 0.06 K/UL
EOSINOPHIL NFR BLD AUTO: 1.1 %
ESTIMATED AVERAGE GLUCOSE: 114 MG/DL
GLUCOSE SERPL-MCNC: 97 MG/DL
HBA1C MFR BLD HPLC: 5.6 %
HCT VFR BLD CALC: 45.4 %
HDLC SERPL-MCNC: 38 MG/DL
HGB BLD-MCNC: 15.9 G/DL
IMM GRANULOCYTES NFR BLD AUTO: 0.2 %
LDLC SERPL CALC-MCNC: 104 MG/DL
LYMPHOCYTES # BLD AUTO: 1.61 K/UL
LYMPHOCYTES NFR BLD AUTO: 30.2 %
MAN DIFF?: NORMAL
MCHC RBC-ENTMCNC: 32 PG
MCHC RBC-ENTMCNC: 35 GM/DL
MCV RBC AUTO: 91.3 FL
MONOCYTES # BLD AUTO: 0.48 K/UL
MONOCYTES NFR BLD AUTO: 9 %
NEUTROPHILS # BLD AUTO: 3.14 K/UL
NEUTROPHILS NFR BLD AUTO: 58.9 %
NONHDLC SERPL-MCNC: 128 MG/DL
PLATELET # BLD AUTO: 328 K/UL
POTASSIUM SERPL-SCNC: 4.2 MMOL/L
PROT SERPL-MCNC: 7.5 G/DL
RBC # BLD: 4.97 M/UL
RBC # FLD: 13.2 %
SODIUM SERPL-SCNC: 141 MMOL/L
TRIGL SERPL-MCNC: 118 MG/DL
TSH SERPL-ACNC: 3.18 UIU/ML
WBC # FLD AUTO: 5.33 K/UL

## 2021-04-13 ENCOUNTER — APPOINTMENT (OUTPATIENT)
Dept: FAMILY MEDICINE | Facility: HOSPITAL | Age: 51
End: 2021-04-13

## 2021-04-13 ENCOUNTER — OUTPATIENT (OUTPATIENT)
Dept: OUTPATIENT SERVICES | Facility: HOSPITAL | Age: 51
LOS: 1 days | End: 2021-04-13
Payer: SELF-PAY

## 2021-04-13 VITALS
HEART RATE: 81 BPM | OXYGEN SATURATION: 97 % | SYSTOLIC BLOOD PRESSURE: 130 MMHG | BODY MASS INDEX: 29.7 KG/M2 | WEIGHT: 184 LBS | DIASTOLIC BLOOD PRESSURE: 84 MMHG | RESPIRATION RATE: 16 BRPM | TEMPERATURE: 97.3 F

## 2021-04-13 DIAGNOSIS — E78.1 PURE HYPERGLYCERIDEMIA: ICD-10-CM

## 2021-04-13 DIAGNOSIS — E55.9 VITAMIN D DEFICIENCY, UNSPECIFIED: ICD-10-CM

## 2021-04-13 DIAGNOSIS — Z00.00 ENCOUNTER FOR GENERAL ADULT MEDICAL EXAMINATION WITHOUT ABNORMAL FINDINGS: ICD-10-CM

## 2021-04-13 PROCEDURE — G0463: CPT

## 2021-04-13 NOTE — ASSESSMENT
[FreeTextEntry1] : 49 yo M with hx of HLD, globus sensation, cecal polyp and laryngopharyngeal reflux presents for follow up after CPE.

## 2021-04-13 NOTE — PLAN
[FreeTextEntry1] : \par \par #Left lower leg pain \par -Possibly due to left peroneal nerve injury\par -XR of left tibia/fibula ordered \par -Pt was previously instructed to complete MRI by Orthopaedist but he is concerned about cost - instructed patient to complete XR\par \par #Globus sensation\par -Encouraged GI follow up - consider EGD\par \par #Hx of laryngopharyngeal reflux\par -Chronic throat pain likely 2/2 laryngopharyngeal reflux \par -Instructed patient to start taking Protonix 40 mg QD\par \par \par #HM\par -CPE completed on 3/24/21\par -Pt refused flu shot today\par -All lab results discussed with patient \par -Colonoscopy 2018 - 5 mm polyp (cecum), internal hemorrhoid- f/u in 1 year was recommended\par -Pt concerned about cost of colonoscopy- discussed CSP option with him and he is agreeable\par \par #HLD\par -Lipid panel- HDL- 38, LDL cholesterol-104\par -Encouraged exercise 3-5 x/week, 30 mins each time \par -Healthy diet low in fat and cholesterol recommended\par -A1C - 5.6%\par \par \par #Vitamin D insufficiency\par -Vitamin D supplementation recommended\par \par #Elevated LFT\par -Possibly due to fatty liver\par -Pt states he does not drink much alcohol \par \par RTC for cancer screening program in 1 week

## 2021-04-13 NOTE — PHYSICAL EXAM
[No Acute Distress] : no acute distress [Well Developed] : well developed [Normal Oropharynx] : the oropharynx was normal [No Lymphadenopathy] : no lymphadenopathy [Supple] : supple [Thyroid Normal, No Nodules] : the thyroid was normal and there were no nodules present [No Respiratory Distress] : no respiratory distress  [No Accessory Muscle Use] : no accessory muscle use [Clear to Auscultation] : lungs were clear to auscultation bilaterally [Normal Rate] : normal rate  [Regular Rhythm] : with a regular rhythm [Normal S1, S2] : normal S1 and S2 [Grossly Normal Strength/Tone] : grossly normal strength/tone [de-identified] : 5/5 strength in b/l LE, left knee- no pain or swelling, + pain reported in anterior lower leg and dorsum of foot, no erythema or warmth noted; left foot also examined- no ulcer present; negative straight leg raise on the left

## 2021-04-13 NOTE — HISTORY OF PRESENT ILLNESS
[[employer] ] : provided by [employer]   [FreeTextEntry1] : [id number] [FreeTextEntry2] : Hattie [TWNoteComboBox1] : Citizen of [place] [de-identified] : 51 yo M with hx of HLD, globus sensation, cecal polyp and laryngopharyngeal reflux presents for follow up after CPE. Pt states he has not yet picked up PPI. Denies improvement in pain with tylenol. He reports having persistent globus sensation, halitosis and throat discomfort. Patient has not seen the dentist or GI despite previous referral.\par Pt states left lower leg pain is worse with movement and he is hesitant to complete MRI due to concern about cost. He denies trauma to the leg and describes leg pain as burning in the anterior lower leg and extending to dorsum of foot near the first 3 digits. Pt ambulating well without assistive device.

## 2021-04-13 NOTE — REVIEW OF SYSTEMS
[Fever] : no fever [Chills] : no chills [Chest Pain] : no chest pain [Shortness Of Breath] : no shortness of breath [Abdominal Pain] : no abdominal pain [Nausea] : no nausea [Vomiting] : no vomiting [Headache] : no headache [FreeTextEntry4] : + throat discomfort and dryness , + globus sensation

## 2021-04-15 DIAGNOSIS — K21.9 GASTRO-ESOPHAGEAL REFLUX DISEASE WITHOUT ESOPHAGITIS: ICD-10-CM

## 2021-04-15 DIAGNOSIS — E55.9 VITAMIN D DEFICIENCY, UNSPECIFIED: ICD-10-CM

## 2021-04-15 DIAGNOSIS — R09.89 OTHER SPECIFIED SYMPTOMS AND SIGNS INVOLVING THE CIRCULATORY AND RESPIRATORY SYSTEMS: ICD-10-CM

## 2021-04-20 ENCOUNTER — APPOINTMENT (OUTPATIENT)
Dept: FAMILY MEDICINE | Facility: HOSPITAL | Age: 51
End: 2021-04-20

## 2021-04-20 ENCOUNTER — OUTPATIENT (OUTPATIENT)
Dept: OUTPATIENT SERVICES | Facility: HOSPITAL | Age: 51
LOS: 1 days | End: 2021-04-20
Payer: COMMERCIAL

## 2021-04-20 ENCOUNTER — OUTPATIENT (OUTPATIENT)
Dept: OUTPATIENT SERVICES | Facility: HOSPITAL | Age: 51
LOS: 1 days | End: 2021-04-20
Payer: SELF-PAY

## 2021-04-20 ENCOUNTER — RESULT REVIEW (OUTPATIENT)
Age: 51
End: 2021-04-20

## 2021-04-20 VITALS
HEIGHT: 66 IN | RESPIRATION RATE: 16 BRPM | TEMPERATURE: 97.5 F | OXYGEN SATURATION: 95 % | BODY MASS INDEX: 29.73 KG/M2 | WEIGHT: 185 LBS | HEART RATE: 91 BPM | SYSTOLIC BLOOD PRESSURE: 135 MMHG | DIASTOLIC BLOOD PRESSURE: 90 MMHG

## 2021-04-20 DIAGNOSIS — Z00.00 ENCOUNTER FOR GENERAL ADULT MEDICAL EXAMINATION WITHOUT ABNORMAL FINDINGS: ICD-10-CM

## 2021-04-20 DIAGNOSIS — M79.605 PAIN IN LEFT LEG: ICD-10-CM

## 2021-04-20 PROCEDURE — 73590 X-RAY EXAM OF LOWER LEG: CPT | Mod: 26,LT

## 2021-04-20 PROCEDURE — 73590 X-RAY EXAM OF LOWER LEG: CPT

## 2021-04-20 PROCEDURE — 82274 ASSAY TEST FOR BLOOD FECAL: CPT

## 2021-04-20 PROCEDURE — G0463: CPT

## 2021-04-20 NOTE — REVIEW OF SYSTEMS
[Fever] : no fever [Chills] : no chills [Chest Pain] : no chest pain [Shortness Of Breath] : no shortness of breath [Abdominal Pain] : no abdominal pain [Nausea] : no nausea [Constipation] : no constipation [Diarrhea] : no diarrhea [Vomiting] : no vomiting [Melena] : no melena [Headache] : no headache [Dizziness] : no dizziness

## 2021-04-20 NOTE — ASSESSMENT
[FreeTextEntry1] : 51 yo M with hx of HLD, globus sensation, cecal polyp and laryngopharyngeal reflux presents for cancer screening program.

## 2021-04-20 NOTE — PHYSICAL EXAM
[No Acute Distress] : no acute distress [Well Developed] : well developed [No Respiratory Distress] : no respiratory distress  [No Accessory Muscle Use] : no accessory muscle use [Clear to Auscultation] : lungs were clear to auscultation bilaterally [Normal Rate] : normal rate  [Regular Rhythm] : with a regular rhythm [Normal S1, S2] : normal S1 and S2 [Soft] : abdomen soft [Non Tender] : non-tender [Non-distended] : non-distended [No Masses] : no abdominal mass palpated [Normal Bowel Sounds] : normal bowel sounds

## 2021-04-20 NOTE — PLAN
[FreeTextEntry1] : \par \par #CSP\par -FIT kit provided to patient\par -Colonoscopy 2018- 5 mm polyp (cecum) and internal hemorrhoid, f/u in 1 year recommended- pt states unable to afford colonoscopy\par -Patient agreeable to proceed with cancer screening program\par \par \par D/w Dr. Izquierdo\par \par F/u globus sensation in 1 month- GI follow up encouraged

## 2021-04-20 NOTE — HISTORY OF PRESENT ILLNESS
[[employer] ] : provided by [employer]   [[facility]] : [id number] [FreeTextEntry2] : Bon [TWNoteComboBox1] : Turkish [de-identified] : 49 yo M with hx of HLD, globus sensation, cecal polyp and laryngopharyngeal reflux presents for cancer screening program. Pt denies BRBPR or melena. Pt reports stool caliber appears more narrow. Pt reports occasional constipation but states he has a bowel movement twice a day. He denies family hx of colon cancer.

## 2021-04-21 ENCOUNTER — NON-APPOINTMENT (OUTPATIENT)
Age: 51
End: 2021-04-21

## 2021-04-21 DIAGNOSIS — Z12.11 ENCOUNTER FOR SCREENING FOR MALIGNANT NEOPLASM OF COLON: ICD-10-CM

## 2021-05-02 LAB — HEMOCCULT STL QL IA: NEGATIVE

## 2021-05-19 ENCOUNTER — OUTPATIENT (OUTPATIENT)
Dept: OUTPATIENT SERVICES | Facility: HOSPITAL | Age: 51
LOS: 1 days | End: 2021-05-19
Payer: SELF-PAY

## 2021-05-19 ENCOUNTER — APPOINTMENT (OUTPATIENT)
Dept: FAMILY MEDICINE | Facility: HOSPITAL | Age: 51
End: 2021-05-19

## 2021-05-19 VITALS
RESPIRATION RATE: 16 BRPM | BODY MASS INDEX: 29.57 KG/M2 | HEART RATE: 85 BPM | HEIGHT: 66 IN | WEIGHT: 184 LBS | SYSTOLIC BLOOD PRESSURE: 121 MMHG | OXYGEN SATURATION: 98 % | DIASTOLIC BLOOD PRESSURE: 83 MMHG | TEMPERATURE: 98 F

## 2021-05-19 DIAGNOSIS — Z00.00 ENCOUNTER FOR GENERAL ADULT MEDICAL EXAMINATION WITHOUT ABNORMAL FINDINGS: ICD-10-CM

## 2021-05-19 PROCEDURE — G0463: CPT

## 2021-05-19 NOTE — HISTORY OF PRESENT ILLNESS
[[employer] ] : provided by [employer]   [FreeTextEntry1] : [id number] [FreeTextEntry2] : Ezequiel [TWNoteComboBox1] : [place]er [de-identified] : 51 yo M with hx of HLD, globus sensation, cecal polyp and laryngopharyngeal reflux presents for f/u globus sensation. Pt reports reflux symptoms present. \par Pt c/o urinary frequency x 15 days and constipation x 1 year. No fevers, chills, dysuria, abdominal pain, N/V/D, no melena or hematochezia. Pt also reports nocturia and occasional halitosis.\par \par Pt reports having a bowel movement twice a day, and reports stool caliber is more narrow.\par Pt reports he is sexually active with one female partner.

## 2021-05-19 NOTE — PHYSICAL EXAM
[No Acute Distress] : no acute distress [Well Developed] : well developed [No Respiratory Distress] : no respiratory distress  [No Accessory Muscle Use] : no accessory muscle use [Clear to Auscultation] : lungs were clear to auscultation bilaterally [Normal Rate] : normal rate  [Regular Rhythm] : with a regular rhythm [Normal S1, S2] : normal S1 and S2 [Soft] : abdomen soft [Non Tender] : non-tender [Non-distended] : non-distended [Normal Bowel Sounds] : normal bowel sounds

## 2021-05-19 NOTE — REVIEW OF SYSTEMS
[Constipation] : constipation [Nocturia] : nocturia [Frequency] : frequency [Heartburn] : heartburn [Fever] : no fever [Chills] : no chills [Chest Pain] : no chest pain [Shortness Of Breath] : no shortness of breath [Abdominal Pain] : no abdominal pain [Nausea] : no nausea [Vomiting] : no vomiting [Dysuria] : no dysuria [Hesitancy] : no hesitancy [Headache] : no headache [Dizziness] : no dizziness

## 2021-05-19 NOTE — PLAN
[FreeTextEntry1] : \par \par \par #HM\par -Refused COVID19 vaccination today\par \par \par \par Case and plan d/w Dr. Christie\par \par

## 2021-05-20 DIAGNOSIS — K59.00 CONSTIPATION, UNSPECIFIED: ICD-10-CM

## 2021-05-20 DIAGNOSIS — R35.1 NOCTURIA: ICD-10-CM

## 2021-06-18 ENCOUNTER — OUTPATIENT (OUTPATIENT)
Dept: OUTPATIENT SERVICES | Facility: HOSPITAL | Age: 51
LOS: 1 days | End: 2021-06-18
Payer: SELF-PAY

## 2021-06-18 ENCOUNTER — APPOINTMENT (OUTPATIENT)
Dept: FAMILY MEDICINE | Facility: HOSPITAL | Age: 51
End: 2021-06-18

## 2021-06-18 VITALS
TEMPERATURE: 97.3 F | WEIGHT: 173 LBS | OXYGEN SATURATION: 96 % | BODY MASS INDEX: 27.8 KG/M2 | RESPIRATION RATE: 16 BRPM | SYSTOLIC BLOOD PRESSURE: 147 MMHG | HEART RATE: 71 BPM | HEIGHT: 66 IN | DIASTOLIC BLOOD PRESSURE: 88 MMHG

## 2021-06-18 DIAGNOSIS — Z92.29 PERSONAL HISTORY OF OTHER DRUG THERAPY: ICD-10-CM

## 2021-06-18 DIAGNOSIS — R35.0 FREQUENCY OF MICTURITION: ICD-10-CM

## 2021-06-18 DIAGNOSIS — Z00.00 ENCOUNTER FOR GENERAL ADULT MEDICAL EXAMINATION W/OUT ABNORMAL FINDINGS: ICD-10-CM

## 2021-06-18 DIAGNOSIS — Z87.19 PERSONAL HISTORY OF OTHER DISEASES OF THE DIGESTIVE SYSTEM: ICD-10-CM

## 2021-06-18 DIAGNOSIS — M79.605 PAIN IN LEFT LEG: ICD-10-CM

## 2021-06-18 DIAGNOSIS — R35.1 NOCTURIA: ICD-10-CM

## 2021-06-18 DIAGNOSIS — K59.00 CONSTIPATION, UNSPECIFIED: ICD-10-CM

## 2021-06-18 DIAGNOSIS — Z00.00 ENCOUNTER FOR GENERAL ADULT MEDICAL EXAMINATION WITHOUT ABNORMAL FINDINGS: ICD-10-CM

## 2021-06-18 PROCEDURE — G0463: CPT

## 2021-06-18 PROCEDURE — 84153 ASSAY OF PSA TOTAL: CPT

## 2021-06-19 PROBLEM — R35.1 NOCTURIA: Status: ACTIVE | Noted: 2021-05-19

## 2021-06-19 PROBLEM — Z87.19 HISTORY OF CHRONIC GASTRITIS: Status: RESOLVED | Noted: 2021-06-19 | Resolved: 2021-06-19

## 2021-06-19 PROBLEM — R35.0 URINARY FREQUENCY: Status: ACTIVE | Noted: 2021-05-19

## 2021-06-19 PROBLEM — K59.00 CONSTIPATION: Status: ACTIVE | Noted: 2021-05-19

## 2021-06-19 LAB — PSA SERPL-MCNC: 0.74 NG/ML

## 2021-06-19 RX ORDER — SENNOSIDES 8.6 MG TABLETS 8.6 MG/1
8.6 TABLET ORAL
Qty: 30 | Refills: 0 | Status: DISCONTINUED | COMMUNITY
Start: 2021-05-19 | End: 2021-06-19

## 2021-06-21 DIAGNOSIS — M25.562 PAIN IN LEFT KNEE: ICD-10-CM

## 2021-07-02 ENCOUNTER — OUTPATIENT (OUTPATIENT)
Dept: OUTPATIENT SERVICES | Facility: HOSPITAL | Age: 51
LOS: 1 days | End: 2021-07-02
Payer: SELF-PAY

## 2021-07-02 ENCOUNTER — APPOINTMENT (OUTPATIENT)
Dept: FAMILY MEDICINE | Facility: HOSPITAL | Age: 51
End: 2021-07-02

## 2021-07-02 VITALS
TEMPERATURE: 98.4 F | OXYGEN SATURATION: 97 % | BODY MASS INDEX: 29.57 KG/M2 | RESPIRATION RATE: 16 BRPM | HEIGHT: 66 IN | DIASTOLIC BLOOD PRESSURE: 81 MMHG | WEIGHT: 184 LBS | SYSTOLIC BLOOD PRESSURE: 128 MMHG | HEART RATE: 67 BPM

## 2021-07-02 DIAGNOSIS — Z00.00 ENCOUNTER FOR GENERAL ADULT MEDICAL EXAMINATION WITHOUT ABNORMAL FINDINGS: ICD-10-CM

## 2021-07-02 PROCEDURE — G0463: CPT

## 2021-07-09 LAB — CORE LAB BIOPSY: NORMAL

## 2021-07-15 ENCOUNTER — APPOINTMENT (OUTPATIENT)
Dept: FAMILY MEDICINE | Facility: HOSPITAL | Age: 51
End: 2021-07-15

## 2021-07-15 ENCOUNTER — OUTPATIENT (OUTPATIENT)
Dept: OUTPATIENT SERVICES | Facility: HOSPITAL | Age: 51
LOS: 1 days | End: 2021-07-15
Payer: SELF-PAY

## 2021-07-15 VITALS
BODY MASS INDEX: 29.25 KG/M2 | RESPIRATION RATE: 14 BRPM | DIASTOLIC BLOOD PRESSURE: 86 MMHG | TEMPERATURE: 97 F | HEART RATE: 76 BPM | OXYGEN SATURATION: 95 % | HEIGHT: 66 IN | SYSTOLIC BLOOD PRESSURE: 126 MMHG | WEIGHT: 182 LBS

## 2021-07-15 DIAGNOSIS — K21.9 GASTRO-ESOPHAGEAL REFLUX DISEASE W/OUT ESOPHAGITIS: ICD-10-CM

## 2021-07-15 DIAGNOSIS — R19.6 HALITOSIS: ICD-10-CM

## 2021-07-15 DIAGNOSIS — K21.9 GASTRO-ESOPHAGEAL REFLUX DISEASE WITHOUT ESOPHAGITIS: ICD-10-CM

## 2021-07-15 DIAGNOSIS — Z00.00 ENCOUNTER FOR GENERAL ADULT MEDICAL EXAMINATION WITHOUT ABNORMAL FINDINGS: ICD-10-CM

## 2021-07-15 DIAGNOSIS — Z98.890 OTHER SPECIFIED POSTPROCEDURAL STATES: ICD-10-CM

## 2021-07-15 DIAGNOSIS — D17.1 BENIGN LIPOMATOUS NEOPLASM OF SKIN AND SUBCUTANEOUS TISSUE OF TRUNK: ICD-10-CM

## 2021-07-15 DIAGNOSIS — L72.0 EPIDERMAL CYST: ICD-10-CM

## 2021-07-15 PROCEDURE — G0463: CPT

## 2021-07-20 NOTE — REVIEW OF SYSTEMS
[Fever] : no fever [Chills] : no chills [Fatigue] : no fatigue [Discharge] : no discharge [Pain] : no pain [Earache] : no earache [Hearing Loss] : no hearing loss [Chest Pain] : no chest pain [Palpitations] : no palpitations [Shortness Of Breath] : no shortness of breath [Wheezing] : no wheezing [Dysuria] : no dysuria [Incontinence] : no incontinence [Joint Pain] : no joint pain [Itching] : no itching [Mole Changes] : no mole changes [Headache] : no headache [Dizziness] : no dizziness [Fainting] : no fainting [Anxiety] : no anxiety [Depression] : no depression [FreeTextEntry4] : hallitosis

## 2021-07-20 NOTE — PLAN
[FreeTextEntry1] : #S/P lIPOMA REMOVAL\par Sutures removed today\par 9 nylon sutures removed\par Patient tolerated it well\par scant bleeding. 4x4 gauze applied. \par \par #Hx of Halitosis\par Likely GERD, Chronic\par WIll send famotidine \par \par Patient to return to clinic in 3 weeks \par \par Discussed with Dr. Meier

## 2021-07-20 NOTE — HISTORY OF PRESENT ILLNESS
[[employer] ] : provided by [employer]   [FreeTextEntry1] : [id number] [FreeTextEntry2] : Shital [TWNoteComboBox1] : Malian [de-identified] : 50 y/o M with pmhx of lipoma in back, s/p removal. Patient here for suture removal. Patient denies any pain in the back, or any discomfort. Patient has reported bad breath in the morning that he notices sometimes. Patient says this started for many years. Patient says it was never too much, but notices it more now. Patient says he notices the bad breath at night. Patient says its not every day. Patient says he has not had any pain with.

## 2021-07-20 NOTE — PHYSICAL EXAM
[No Acute Distress] : no acute distress [Normal Sclera/Conjunctiva] : normal sclera/conjunctiva [PERRL] : pupils equal round and reactive to light [Normal Outer Ear/Nose] : the outer ears and nose were normal in appearance [Normal Oropharynx] : the oropharynx was normal [No JVD] : no jugular venous distention [No Lymphadenopathy] : no lymphadenopathy [No Respiratory Distress] : no respiratory distress  [No Accessory Muscle Use] : no accessory muscle use [Clear to Auscultation] : lungs were clear to auscultation bilaterally [Normal Rate] : normal rate  [Regular Rhythm] : with a regular rhythm [Normal S1, S2] : normal S1 and S2 [Soft] : abdomen soft [Non Tender] : non-tender [No HSM] : no HSM [Normal Bowel Sounds] : normal bowel sounds [de-identified] : sutures present on back. Wound has healed. Skin Brought back together.

## 2021-08-08 NOTE — HISTORY OF PRESENT ILLNESS
[[employer] ] : provided by [employer]   [FreeTextEntry1] : [id number] [FreeTextEntry2] : Ella [TWNoteComboBox1] : [place] [de-identified] : 51 yo M with hx of HLD, globus sensation, cecal polyp and laryngopharyngeal reflux presents for c/o chronic lump on his back. Patient states the area appears inflamed and has been present for years, however now worsening, which it has in the past. \par Patient states that about 3 years ago the lump grew and he was able to express water from the lump. He denies having it surgically removed. States he started using a cream for it a few years ago.\par \par Urinary frequency- still present, slightly improved as per patient. No dysuria or hematuria.

## 2021-08-08 NOTE — ADDENDUM
[FreeTextEntry1] : #Cyst Removal\par Patient tolerated procedure well\par Triple Antibiotic applied to wound after closure\par 9 nylon sutures placed \par Naproxen 500mg BID sent to pharmacy\par Home care instructions given to patient \par Patient to return to clinic on 7/15 for suture removal and wound check \par \par Discussed with Dr. Garnett

## 2021-08-08 NOTE — REVIEW OF SYSTEMS
[Constipation] : constipation [Joint Pain] : joint pain [Hesitancy] : hesitancy [Nocturia] : nocturia [Frequency] : frequency [Fever] : no fever [Chills] : no chills [Chest Pain] : no chest pain [Palpitations] : no palpitations [Shortness Of Breath] : no shortness of breath [Wheezing] : no wheezing [Cough] : no cough [Abdominal Pain] : no abdominal pain [Nausea] : no nausea [Vomiting] : no vomiting [Dysuria] : no dysuria [Incontinence] : no incontinence [Hematuria] : no hematuria [Headache] : no headache [Dizziness] : no dizziness [FreeTextEntry8] : p [FreeTextEntry9] : left knee pain

## 2021-08-08 NOTE — PROCEDURE
[Excision Of Lesion] : excision of lesion [Patient] : patient [Risks] : risks [Benefits] : benefits [Alternatives] : alternatives [Allergic Reaction] : allergic reaction [Betadine] : Betadine [___mls Injected] : [unfilled] ~Umls [1%] : 1% [With Epi] : with epinephrine [Excised: Margin ___mm] : the lesion was excised with a  [unfilled] ~Umm margin in an elliptical fashion [___Sutures of] : [unfilled] sutures of [___-0] : [unfilled]-0 [Nylon] : nylon [Matress] : horizontal matress sutures were used for the skin closure [Sent to Path] : the excised lesion was place in buffered formalin and sent for pathlogy [Dressing Applied] : an antiobiotic ointment was applied and a sterile dressing was placed [Tolerated Well] : the patient tolerated the procedure well [No Complications] : there were no complications [Consent Obtained] : written consent was obtained prior to the procedure. [FreeTextEntry4] : sites cauterized during procedure, hemostasis achieved [FreeTextEntry1] : excision of dermal cyst located on back.  [FreeTextEntry2] : removal to prevent recurrence  [FreeTextEntry3] : Patient is a 51 y/o M here for cyst removal. Area was cleaned with iodine. Patient prepped and draped. Consent obtained, time out performed. Cyst excised with 6 cm incision. Incision closed with 9 4-0 nylon sutures.

## 2021-08-08 NOTE — END OF VISIT
[Resident] : Resident [FreeTextEntry2] : risks and benefits discussed with patient. Patient tolerated procedure well.

## 2021-08-08 NOTE — PHYSICAL EXAM
[Well Developed] : well developed [No Acute Distress] : no acute distress [No Respiratory Distress] : no respiratory distress  [No Accessory Muscle Use] : no accessory muscle use [Clear to Auscultation] : lungs were clear to auscultation bilaterally [Normal Rate] : normal rate  [Regular Rhythm] : with a regular rhythm [Normal S1, S2] : normal S1 and S2 [No Varicosities] : no varicosities [No Edema] : there was no peripheral edema [Soft] : abdomen soft [Non Tender] : non-tender [Non-distended] : non-distended [No Masses] : no abdominal mass palpated [No Spinal Tenderness] : no spinal tenderness [No Joint Swelling] : no joint swelling [Grossly Normal Strength/Tone] : grossly normal strength/tone [No Rash] : no rash [Prostate Enlargement] : the prostate was not enlarged [Prostate Tenderness] : the prostate was not tender [No Prostate Nodules] : no prostate nodules [de-identified] : round, <1 cm, tender, firm lump on thoracic region of back, non erythematous, no pus or drainage  [de-identified] : no crepitus of left knee

## 2021-08-08 NOTE — PLAN
[FreeTextEntry1] : \par \par \par \par #Elevated LFTs\par -Denies ETOH use\par -Recommend hepatitis panel in future and abdominal US\par \par \par D/w Dr. Garnett\par F/u within 1 month for removal of epidermoid cyst removal in Dermatology clinic

## 2021-08-17 ENCOUNTER — APPOINTMENT (OUTPATIENT)
Dept: FAMILY MEDICINE | Facility: HOSPITAL | Age: 51
End: 2021-08-17

## 2021-09-14 ENCOUNTER — OUTPATIENT (OUTPATIENT)
Dept: OUTPATIENT SERVICES | Facility: HOSPITAL | Age: 51
LOS: 1 days | End: 2021-09-14
Payer: SELF-PAY

## 2021-09-14 ENCOUNTER — APPOINTMENT (OUTPATIENT)
Age: 51
End: 2021-09-14

## 2021-09-14 VITALS
HEIGHT: 66 IN | BODY MASS INDEX: 29.57 KG/M2 | TEMPERATURE: 98.2 F | DIASTOLIC BLOOD PRESSURE: 95 MMHG | HEART RATE: 73 BPM | SYSTOLIC BLOOD PRESSURE: 144 MMHG | WEIGHT: 184 LBS | RESPIRATION RATE: 14 BRPM

## 2021-09-14 DIAGNOSIS — R10.9 UNSPECIFIED ABDOMINAL PAIN: ICD-10-CM

## 2021-09-14 PROCEDURE — G0463: CPT

## 2021-09-14 NOTE — HISTORY OF PRESENT ILLNESS
[Wt Loss ___ Lbs] : no recent weight loss [de-identified] : 52 yo M w/ PMHx HLD here for follow up of throat pain\par \par Today patient reports he would like to have his throat checked. Has lots of phlegm, and some tightness in throat while talking. Patient points to submadibular region when describing pain. No cough. No dysphagia/odynophagia to both solids or liquids. Discomfort is only present while speaking. \par \par Seen by GI 3/2018 w/ complaint of halitosis. 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 Patient was prescribed PPI, which he took w/ some improvement of symptoms. Also was seen by ENT at the time, underwent laryngoscopy w/ findings c/f mild LPR, given PPI/H2B. Currently patient is not taking any antacids. \par \par Patient underwent colonoscopy 2018 w/ cecal polyp (hyperplastic), however prep was inadequate and recommended repeat colonoscopy within 1 year.

## 2021-09-14 NOTE — PHYSICAL EXAM
[General Appearance - Alert] : alert [General Appearance - In No Acute Distress] : in no acute distress [General Appearance - Well Nourished] : well nourished [General Appearance - Well Developed] : well developed [Sclera] : the sclera and conjunctiva were normal [Extraocular Movements] : extraocular movements were intact [Outer Ear] : the ears and nose were normal in appearance [Neck Appearance] : the appearance of the neck was normal [Neck Cervical Mass (___cm)] : no neck mass was observed [Jugular Venous Distention Increased] : there was no jugular-venous distention [Exaggerated Use Of Accessory Muscles For Inspiration] : no accessory muscle use [Heart Rate And Rhythm] : heart rate was normal and rhythm regular [Edema] : there was no peripheral edema [Bowel Sounds] : normal bowel sounds [Abdomen Soft] : soft [Abdomen Tenderness] : non-tender [] : no hepato-splenomegaly [Abdomen Mass (___ Cm)] : no abdominal mass palpated [No CVA Tenderness] : no ~M costovertebral angle tenderness [No Spinal Tenderness] : no spinal tenderness [Abnormal Walk] : normal gait [Musculoskeletal - Swelling] : no joint swelling seen [Skin Color & Pigmentation] : normal skin color and pigmentation [Oriented To Time, Place, And Person] : oriented to person, place, and time [Impaired Insight] : insight and judgment were intact [Affect] : the affect was normal

## 2021-09-14 NOTE — ASSESSMENT
[FreeTextEntry1] : 52 yo M w/ PMHx HLD prsenting w/ hialitosis and complaints of chest tightness while talking\par \par # chest tightness - unlikely GI in etiology, ddx includes pulmonary vs cardiac; however atypical extraintestinal reflux symptoms possible as well. Will trial PPI and monitor for response, if no improvement will refer for pH impedance testing. \par \par # hialatosis - no reports of regurgitation, no Zenkers seen on prior EGD\par # CRC screening - colonoscopy 2018 w/ inadequate preparation, cecal polyp removed (5mm), had FOBT this year however since patient w/ h/o polyps cannot use FOBT as screening test. Will schedule for colonoscopy\par \par Recommendations:\par - pantoprazole 40mg qd\par - colonoscopy w/ MiraLAX prep given previous polyps; risks/benefits explained\par - Dental follow-up for halitosis\par - RTC 2 months; consider impedance study if throat pain persists\par \par Afshin Shannon, PGY5\par GI Fellow

## 2021-09-15 DIAGNOSIS — K21.9 GASTRO-ESOPHAGEAL REFLUX DISEASE WITHOUT ESOPHAGITIS: ICD-10-CM

## 2021-09-15 DIAGNOSIS — R19.6 HALITOSIS: ICD-10-CM

## 2021-10-18 ENCOUNTER — EMERGENCY (EMERGENCY)
Facility: HOSPITAL | Age: 51
LOS: 1 days | Discharge: HOME | End: 2021-10-18
Attending: EMERGENCY MEDICINE | Admitting: EMERGENCY MEDICINE
Payer: MEDICAID

## 2021-10-18 VITALS
HEART RATE: 80 BPM | RESPIRATION RATE: 18 BRPM | WEIGHT: 184.97 LBS | TEMPERATURE: 98 F | DIASTOLIC BLOOD PRESSURE: 96 MMHG | HEIGHT: 64.96 IN | OXYGEN SATURATION: 97 % | SYSTOLIC BLOOD PRESSURE: 177 MMHG

## 2021-10-18 LAB — SARS-COV-2 RNA SPEC QL NAA+PROBE: SIGNIFICANT CHANGE UP

## 2021-10-18 PROCEDURE — 99282 EMERGENCY DEPT VISIT SF MDM: CPT

## 2021-10-18 PROCEDURE — 87635 SARS-COV-2 COVID-19 AMP PRB: CPT

## 2021-10-18 PROCEDURE — 99283 EMERGENCY DEPT VISIT LOW MDM: CPT

## 2021-10-18 NOTE — ED PROVIDER NOTE - NSFOLLOWUPINSTRUCTIONS_ED_ALL_ED_FT
-- Return to the ER for worsening or persistent symptoms, and/or ANY NEW OR CONCERNING SYMPTOMS.    -- If you have difficulty following up, please call: [phone] to obtain a [facility] doctor or specialist who takes your insurance in your area.

## 2021-10-18 NOTE — ED PROVIDER NOTE - PATIENT PORTAL LINK FT
You can access the FollowMyHealth Patient Portal offered by [facility] by registering at the following website: [email or web address]. By joining [facility]’s FollowMyHealth portal, you will also be able to view your health information using other applications (apps) compatible with our system.

## 2021-11-17 ENCOUNTER — OUTPATIENT (OUTPATIENT)
Dept: OUTPATIENT SERVICES | Facility: HOSPITAL | Age: 51
LOS: 1 days | End: 2021-11-17
Payer: SELF-PAY

## 2021-11-17 ENCOUNTER — APPOINTMENT (OUTPATIENT)
Dept: FAMILY MEDICINE | Facility: HOSPITAL | Age: 51
End: 2021-11-17

## 2021-11-17 VITALS
SYSTOLIC BLOOD PRESSURE: 123 MMHG | TEMPERATURE: 97.5 F | WEIGHT: 188 LBS | DIASTOLIC BLOOD PRESSURE: 78 MMHG | BODY MASS INDEX: 30.34 KG/M2 | RESPIRATION RATE: 15 BRPM | OXYGEN SATURATION: 97 % | HEART RATE: 80 BPM

## 2021-11-17 DIAGNOSIS — Z00.00 ENCOUNTER FOR GENERAL ADULT MEDICAL EXAMINATION WITHOUT ABNORMAL FINDINGS: ICD-10-CM

## 2021-11-17 DIAGNOSIS — R74.8 ABNORMAL LEVELS OF OTHER SERUM ENZYMES: ICD-10-CM

## 2021-11-17 PROCEDURE — G0463: CPT

## 2021-11-18 DIAGNOSIS — L30.9 DERMATITIS, UNSPECIFIED: ICD-10-CM

## 2021-11-23 ENCOUNTER — APPOINTMENT (OUTPATIENT)
Dept: GASTROENTEROLOGY | Facility: HOSPITAL | Age: 51
End: 2021-11-23

## 2021-12-08 NOTE — HISTORY OF PRESENT ILLNESS
[FreeTextEntry1] : wound check  [de-identified] : Niue[place] ID:[id number]\par Patient is 52 y/o M who had lipoma removal in July 2021. Today he is coming in because  he reports that since September the site of his lipoma removal has been itchy. Denies having any fevers. Denies any oozing of pus from the incision site or any redness surrounding the site.

## 2021-12-08 NOTE — PLAN
[FreeTextEntry1] : #Itchiness at site of lipoma removal \par - Informed patient that the sensation of itchiness is a normal part of wound healing \par - Advised to apply petroleum jelly at the site for relief \par \par #Elevated liver ezymes \par - Noted from CMP in MArch 2021 \par - Lab requisition given to check AST/ALT levels \par \par Case discussed with Dr. Meier. \par

## 2021-12-08 NOTE — PHYSICAL EXAM
[No Acute Distress] : no acute distress [Well Nourished] : well nourished [Well Developed] : well developed [No Respiratory Distress] : no respiratory distress  [No Accessory Muscle Use] : no accessory muscle use [Clear to Auscultation] : lungs were clear to auscultation bilaterally [Normal Rate] : normal rate  [Regular Rhythm] : with a regular rhythm [Normal S1, S2] : normal S1 and S2 [No Murmur] : no murmur heard [de-identified] : appropriately healing wound on back at the site of lipoma removal

## 2022-01-11 ENCOUNTER — OUTPATIENT (OUTPATIENT)
Dept: OUTPATIENT SERVICES | Facility: HOSPITAL | Age: 52
LOS: 1 days | End: 2022-01-11
Payer: MEDICARE

## 2022-01-11 DIAGNOSIS — Z11.52 ENCOUNTER FOR SCREENING FOR COVID-19: ICD-10-CM

## 2022-01-11 LAB — SARS-COV-2 RNA SPEC QL NAA+PROBE: SIGNIFICANT CHANGE UP

## 2022-01-11 PROCEDURE — C9803: CPT

## 2022-01-11 PROCEDURE — U0003: CPT

## 2022-01-11 PROCEDURE — U0005: CPT

## 2022-01-12 ENCOUNTER — RESULT REVIEW (OUTPATIENT)
Age: 52
End: 2022-01-12

## 2022-01-12 ENCOUNTER — OUTPATIENT (OUTPATIENT)
Dept: OUTPATIENT SERVICES | Facility: HOSPITAL | Age: 52
LOS: 1 days | End: 2022-01-12
Payer: SELF-PAY

## 2022-01-12 VITALS
DIASTOLIC BLOOD PRESSURE: 91 MMHG | SYSTOLIC BLOOD PRESSURE: 135 MMHG | OXYGEN SATURATION: 97 % | HEART RATE: 77 BPM | HEIGHT: 67 IN | WEIGHT: 184.09 LBS | RESPIRATION RATE: 12 BRPM | TEMPERATURE: 97 F

## 2022-01-12 DIAGNOSIS — Z00.00 ENCOUNTER FOR GENERAL ADULT MEDICAL EXAMINATION WITHOUT ABNORMAL FINDINGS: ICD-10-CM

## 2022-01-12 PROCEDURE — 45385 COLONOSCOPY W/LESION REMOVAL: CPT | Mod: PT

## 2022-01-12 PROCEDURE — 45385 COLONOSCOPY W/LESION REMOVAL: CPT | Mod: GC

## 2022-01-12 PROCEDURE — 88305 TISSUE EXAM BY PATHOLOGIST: CPT | Mod: 26

## 2022-01-12 PROCEDURE — 88305 TISSUE EXAM BY PATHOLOGIST: CPT

## 2022-01-12 DEVICE — NET RETRV ROT ROTH 2.5MMX230CM: Type: IMPLANTABLE DEVICE | Status: FUNCTIONAL

## 2022-01-12 RX ORDER — SODIUM CHLORIDE 9 MG/ML
500 INJECTION INTRAMUSCULAR; INTRAVENOUS; SUBCUTANEOUS
Refills: 0 | Status: COMPLETED | OUTPATIENT
Start: 2022-01-12 | End: 2022-01-12

## 2022-01-12 RX ORDER — SODIUM CHLORIDE 9 MG/ML
500 INJECTION INTRAMUSCULAR; INTRAVENOUS; SUBCUTANEOUS
Refills: 0 | Status: DISCONTINUED | OUTPATIENT
Start: 2022-01-12 | End: 2022-01-26

## 2022-01-12 RX ADMIN — SODIUM CHLORIDE 30 MILLILITER(S): 9 INJECTION INTRAMUSCULAR; INTRAVENOUS; SUBCUTANEOUS at 12:02

## 2022-01-12 NOTE — ASU PATIENT PROFILE, ADULT - NSICDXPASTMEDICALHX_GEN_ALL_CORE_FT
[Nasal Discharge] : nasal discharge [Nasal Congestion] : nasal congestion [Wheezing] : wheezing [Cough] : cough [Negative] : Genitourinary [Fever] : no fever PAST MEDICAL HISTORY:  No pertinent past medical history

## 2022-01-12 NOTE — ASU DISCHARGE PLAN (ADULT/PEDIATRIC) - NS MD DC FALL RISK RISK
For information on Fall & Injury Prevention, visit: [email or web address] OR  [email or web address] OR  https://www.cdc.gov/steadi/patient.html

## 2022-01-12 NOTE — ASU PATIENT PROFILE, ADULT - FALL HARM RISK - UNIVERSAL INTERVENTIONS
Bed in lowest position, wheels locked, appropriate side rails in place/Call bell, personal items and telephone in reach/Instruct patient to call for assistance before getting out of bed or chair/Non-slip footwear when patient is out of bed/[place] to call system/Physically safe environment - no spills, clutter or unnecessary equipment/Purposeful Proactive Rounding/Room/bathroom lighting operational, light cord in reach

## 2022-01-13 LAB — SURGICAL PATHOLOGY STUDY: SIGNIFICANT CHANGE UP

## 2022-01-20 ENCOUNTER — NON-APPOINTMENT (OUTPATIENT)
Age: 52
End: 2022-01-20

## 2022-01-25 VITALS — WEIGHT: 184.09 LBS | HEIGHT: 67 IN

## 2022-01-25 NOTE — PRE-ANESTHESIA EVALUATION ADULT - NSANTHOSAYNRD_GEN_A_CORE
No. CORBY screening performed.  STOP BANG Legend: 0-2 = LOW Risk; 3-4 = INTERMEDIATE Risk; 5-8 = HIGH Risk
No. CORBY screening performed.  STOP BANG Legend: 0-2 = LOW Risk; 3-4 = INTERMEDIATE Risk; 5-8 = HIGH Risk

## 2022-05-16 NOTE — PHYSICAL EXAM
[No Acute Distress] : no acute distress [Well Nourished] : well nourished [Well Developed] : well developed [Well-Appearing] : well-appearing [Normal Sclera/Conjunctiva] : normal sclera/conjunctiva [PERRL] : pupils equal round and reactive to light [EOMI] : extraocular movements intact Yes [Normal Outer Ear/Nose] : the outer ears and nose were normal in appearance [Normal Oropharynx] : the oropharynx was normal [No JVD] : no jugular venous distention [Supple] : supple [No Lymphadenopathy] : no lymphadenopathy [Thyroid Normal, No Nodules] : the thyroid was normal and there were no nodules present [No Respiratory Distress] : no respiratory distress  [Clear to Auscultation] : lungs were clear to auscultation bilaterally [No Accessory Muscle Use] : no accessory muscle use [Normal Rate] : normal rate  [Regular Rhythm] : with a regular rhythm [Normal S1, S2] : normal S1 and S2 [No Murmur] : no murmur heard [No Carotid Bruits] : no carotid bruits [No Abdominal Bruit] : a ~M bruit was not heard ~T in the abdomen [No Varicosities] : no varicosities [Pedal Pulses Present] : the pedal pulses are present [No Edema] : there was no peripheral edema [No Extremity Clubbing/Cyanosis] : no extremity clubbing/cyanosis [No Palpable Aorta] : no palpable aorta [Soft] : abdomen soft [Non Tender] : non-tender [Non-distended] : non-distended [No Masses] : no abdominal mass palpated [No HSM] : no HSM [Normal Bowel Sounds] : normal bowel sounds [Normal Posterior Cervical Nodes] : no posterior cervical lymphadenopathy [Normal Anterior Cervical Nodes] : no anterior cervical lymphadenopathy [No CVA Tenderness] : no CVA  tenderness [No Spinal Tenderness] : no spinal tenderness [No Joint Swelling] : no joint swelling [Grossly Normal Strength/Tone] : grossly normal strength/tone [No Rash] : no rash [Normal Gait] : normal gait [Coordination Grossly Intact] : coordination grossly intact [No Focal Deficits] : no focal deficits [Deep Tendon Reflexes (DTR)] : deep tendon reflexes were 2+ and symmetric [Normal Affect] : the affect was normal [Normal Insight/Judgement] : insight and judgment were intact

## 2022-08-07 NOTE — ED ADULT NURSE NOTE - CAS EDN DISCHARGE ASSESSMENT
"[facility] - Transplant Stepdown  Urology  Consult Note    Patient Name: Jordan Lopez Jr.  MRN: [id number]  Admission Date: 8/6/2022  Hospital Length of Stay: 0   Code Status: Full Code   Attending Provider: Brian Li MD   Consulting Provider: Lon Foss MD  Primary Care Physician: Jose Rafael Trujillo MD  Principal Problem:Acute cystitis with hematuria    Inpatient consult to Urology  Consult performed by: Lon Foss MD  Consult ordered by: Brian Li MD          Subjective:     HPI:  Jordan Lopez is a 66 year old male with a history of prostate cancer s/p XRT and ADT completed 8/21, HCC, RCC s/p R nephrectomy, HTN, DM presenting with dysuria. Urology was consulted for hydronephrosis.     Patient states he has had urgency, dysuria, ISIS for 3 days. Subjective fevers at home. Denies nausea, vomiting.     On assessment, patient is AF, HDS. WBC wnl, Cr 2.8 (baseline 1.3). UA LE positive, no microscopy available for review. Per outside report "CT abdomen and pelvis noted moderate hydroureteronephrosis of the transplanted kidney in the left side of the pelvis.  Milder hydroureteronephrosis of the native left kidney...no calculi," these images are not available for review. Renal u/s shows absent R kidney, mild cortical thinning of L native kidney without hydronephrosis, mild hydronephrosis of the transplant kidney. Bladder does not appear distended on u/s.      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 Followed [facility]     Type 2 diabetes mellitus 2007    diet controlled        Past Surgical History: "   Procedure Laterality Date    KIDNEY TRANSPLANT  6/29/2012    Combined liver-kidney transplant    LIVER TRANSPLANT  6/29/2012    Combined liver-kidney transplant    NEPHRECTOMY  2010    For stage 1 RCC right kidney (done at [facility])       Review of patient's allergies indicates:   Allergen Reactions    Levaquin [levofloxacin] Rash       Family History       Problem Relation (Age of Onset)    Heart disease Father    Kidney disease Mother            Tobacco Use    Smoking status: Never Smoker    Smokeless tobacco: Never Used   Substance and Sexual Activity    Alcohol use: No     Comment: Perhaps 2-3 drinks per year, never a drinker    Drug use: No    Sexual activity: Not on file       Review of Systems   Constitutional:  Positive for fever. Negative for activity change and chills.   Respiratory:  Negative for shortness of breath.    Cardiovascular:  Negative for chest pain.   Gastrointestinal:  Negative for abdominal pain, diarrhea, nausea and vomiting.   Genitourinary:  Positive for difficulty urinating, dysuria, frequency and hematuria. Negative for flank pain.   Neurological:  Negative for dizziness and weakness.     Objective:     Temp:  [98.1 °F (36.7 °C)-99.6 °F (37.6 °C)] 99.6 °F (37.6 °C)  Pulse:  [] 100  Resp:  [18] 18  SpO2:  [95 %-96 %] 95 %  BP: (148-161)/(70-75) 161/75     Body mass index is 42.01 kg/m².           Drains       None                   Physical Exam  Vitals reviewed.   Constitutional:       General: He is not in acute distress.     Appearance: He is well-developed. He is not diaphoretic.   HENT:      Head: Normocephalic and atraumatic.   Eyes:      General: No scleral icterus.  Cardiovascular:      Rate and Rhythm: Normal rate.   Pulmonary:      Effort: Pulmonary effort is normal. No respiratory distress.      Breath sounds: No stridor.   Abdominal:      General: There is no distension.      Palpations: Abdomen is soft.      Tenderness: There is no abdominal tenderness.  There is no right CVA tenderness or left CVA tenderness.      Comments: Chevron incision, prior Ltx  LLQ transplant without tenderness   Genitourinary:     Comments: Circumcised male with orthotopic meatus  Musculoskeletal:         General: Normal range of motion.      Cervical back: Normal range of motion.   Skin:     General: Skin is warm and dry.   Neurological:      Mental Status: He is alert.   Psychiatric:         Behavior: Behavior normal.       Significant Labs:    BMP:  Recent Labs   Lab 08/06/22  1548   *   K 4.5      CO2 21*   BUN 39*   CREATININE 2.3*   CALCIUM 9.0       CBC:  Recent Labs   Lab 08/06/22  1548   WBC 3.67*   HGB 12.1*   HCT 35.9*   *       Blood Culture: No results for input(s): LABBLOO in the last 168 hours.  Urine Culture: No results for input(s): LABURIN in the last 168 hours.  Urine Studies:   Recent Labs   Lab 08/06/22  1553   COLORU Red*   APPEARANCEUA Cloudy*   PHUR 7.0   SPECGRAV 1.010   PROTEINUA 2+*   GLUCUA 2+*   KETONESU Trace*   BILIRUBINUA Negative   OCCULTUA 2+*   NITRITE Negative   LEUKOCYTESUR Trace*   RBCUA >100*   WBCUA 0   BACTERIA None   HYALINECASTS 0       Significant Imaging:  Findings as above                      Assessment and Plan:     MAYNOR (acute kidney injury)  Jordan Lopez is a 66 year old male with a history of prostate cancer s/p XRT and ADT completed 8/21, HCC, RCC s/p R nephrectomy, HTN, DM presenting with dysuria. Urology was consulted for hydronephrosis.   - mild transplant hydronephrosis with MAYNOR, patient with subjective ISIS  - recommend bladder scan PVR followed by catheter placement  - trend Cr, avoid nephrotoxins  - no present indication for urologic intervention  - remainder of care per primary        VTE Risk Mitigation (From admission, onward)         Ordered     enoxaparin injection 40 mg  Every 12 hours         08/06/22 1510     IP VTE HIGH RISK PATIENT  Once         08/06/22 1510     Place sequential compression device   Until discontinued         08/06/22 1510                Lon Foss MD  Urology  [facility] - Transplant Stepdown   Alert and oriented to person, place and time

## 2022-08-12 NOTE — PACU DISCHARGE NOTE - PAIN:
[FreeTextEntry1] : 23 month old with diffuse hypotonia and motor development delay with continued progression of skills. Neurologic examination as above not consistent with specific neuromuscular disorder. Can proceed with screening labs, and determine whether genetic testing pending results. 
Controlled with current regime
Controlled with current regime

## 2023-01-19 ENCOUNTER — LABORATORY RESULT (OUTPATIENT)
Age: 53
End: 2023-01-19

## 2023-01-19 ENCOUNTER — APPOINTMENT (OUTPATIENT)
Dept: FAMILY MEDICINE | Facility: HOSPITAL | Age: 53
End: 2023-01-19

## 2023-01-19 ENCOUNTER — OUTPATIENT (OUTPATIENT)
Dept: OUTPATIENT SERVICES | Facility: HOSPITAL | Age: 53
LOS: 1 days | End: 2023-01-19
Payer: SELF-PAY

## 2023-01-19 VITALS
WEIGHT: 189 LBS | TEMPERATURE: 98.9 F | OXYGEN SATURATION: 96 % | SYSTOLIC BLOOD PRESSURE: 144 MMHG | HEART RATE: 65 BPM | BODY MASS INDEX: 30.51 KG/M2 | RESPIRATION RATE: 16 BRPM | DIASTOLIC BLOOD PRESSURE: 86 MMHG

## 2023-01-19 DIAGNOSIS — G57.00 LESION OF SCIATIC NERVE, UNSPECIFIED LOWER LIMB: ICD-10-CM

## 2023-01-19 DIAGNOSIS — Z00.00 ENCOUNTER FOR GENERAL ADULT MEDICAL EXAMINATION WITHOUT ABNORMAL FINDINGS: ICD-10-CM

## 2023-01-19 DIAGNOSIS — L72.0 EPIDERMAL CYST: ICD-10-CM

## 2023-01-19 DIAGNOSIS — Z23 ENCOUNTER FOR IMMUNIZATION: ICD-10-CM

## 2023-01-19 DIAGNOSIS — Z86.018 PERSONAL HISTORY OF OTHER BENIGN NEOPLASM: ICD-10-CM

## 2023-01-19 DIAGNOSIS — Z98.890 OTHER SPECIFIED POSTPROCEDURAL STATES: ICD-10-CM

## 2023-01-19 DIAGNOSIS — Z12.11 ENCOUNTER FOR SCREENING FOR MALIGNANT NEOPLASM OF COLON: ICD-10-CM

## 2023-01-19 DIAGNOSIS — Z91.89 OTHER SPECIFIED PERSONAL RISK FACTORS, NOT ELSEWHERE CLASSIFIED: ICD-10-CM

## 2023-01-19 PROCEDURE — 86666 EHRLICHIA ANTIBODY: CPT

## 2023-01-19 PROCEDURE — 86753 PROTOZOA ANTIBODY NOS: CPT

## 2023-01-19 PROCEDURE — 86617 LYME DISEASE ANTIBODY: CPT

## 2023-01-19 PROCEDURE — 86618 LYME DISEASE ANTIBODY: CPT

## 2023-01-19 PROCEDURE — G0463: CPT

## 2023-01-19 RX ORDER — FAMOTIDINE 20 MG/1
20 TABLET, FILM COATED ORAL
Qty: 60 | Refills: 1 | Status: DISCONTINUED | COMMUNITY
Start: 2021-07-15 | End: 2023-01-19

## 2023-01-19 RX ORDER — IBUPROFEN 400 MG/1
400 TABLET, FILM COATED ORAL TWICE DAILY
Qty: 14 | Refills: 0 | Status: DISCONTINUED | COMMUNITY
Start: 2021-06-19 | End: 2023-01-19

## 2023-01-19 RX ORDER — MULTIVIT-MIN/IRON/FOLIC ACID/K 18-600-40
50 MCG CAPSULE ORAL
Qty: 90 | Refills: 0 | Status: ACTIVE | COMMUNITY
Start: 2023-01-19 | End: 1900-01-01

## 2023-01-19 RX ORDER — POLYETHYLENE GLYCOL 3350 17 G/17G
17 POWDER, FOR SOLUTION ORAL DAILY
Qty: 30 | Refills: 0 | Status: DISCONTINUED | COMMUNITY
Start: 2021-06-19 | End: 2023-01-19

## 2023-01-19 RX ORDER — CHOLECALCIFEROL (VITAMIN D3) 1250 MCG
1.25 MG CAPSULE ORAL WEEKLY
Qty: 10 | Refills: 0 | Status: ACTIVE | COMMUNITY
Start: 2023-01-19 | End: 1900-01-01

## 2023-01-19 RX ORDER — NAPROXEN 500 MG/1
500 TABLET ORAL
Qty: 8 | Refills: 0 | Status: DISCONTINUED | COMMUNITY
Start: 2021-07-02 | End: 2023-01-19

## 2023-01-19 RX ORDER — ACETAMINOPHEN 500 MG/1
500 TABLET ORAL
Qty: 84 | Refills: 0 | Status: ACTIVE | COMMUNITY
Start: 2023-01-19 | End: 1900-01-01

## 2023-01-19 RX ORDER — POLYETHYLENE GLYCOL 3350 17 G/17G
17 POWDER, FOR SOLUTION ORAL
Qty: 236 | Refills: 0 | Status: DISCONTINUED | COMMUNITY
Start: 2021-09-14 | End: 2023-01-19

## 2023-01-19 NOTE — HISTORY OF PRESENT ILLNESS
[FreeTextEntry8] : 52M with PMH of cecal polyps (hyperplastic), HLD and laryngopharyngeal reflux present to clinic c/o tingling sensation on face x 3 months. Pt reports the tingling sensation occurs diffusely on either side of the face every few days to weeks. It always occurs on one side of the face, left or right and never on both. It lasts 1-2 hours and occurs suddenly. Associated symptom of eye pain, but denies of vision changes or watery eyes. Pt works as a , and is unaware if he had tick bites while working. Denies fever, chills, HA, CP, palpitation, diarrhea, nausea, vomiting, rash, sensory or motor deficit. \par \par Pt c/o halitosis. Pt seen by GI 3/2018 and underwent EDG w/ finding concerning for possible Staton's, but biopsies without metaplasia. No Zenker. Also seen by ENT and underwent laryngoscopy with findings concerning for mild LPR. Pt was given PPI/H2B for treatment, but has not been consistently taking the medication as it gave minimal relief. \par \par Pt c/o L gluteal pain that radiates down buttock and leg x 2 months. The pain is intermittent and worse with walking. \par \par

## 2023-01-19 NOTE — REVIEW OF SYSTEMS
[Pain] : pain [Cough] : cough [Negative] : Heme/Lymph [Fever] : no fever [Chills] : no chills [Fatigue] : no fatigue [Discharge] : no discharge [Redness] : no redness [Vision Problems] : no vision problems [Itching] : no itching [Earache] : no earache [Hearing Loss] : no hearing loss [Nosebleeds] : no nosebleeds [Nasal Discharge] : no nasal discharge [Sore Throat] : no sore throat [Hoarseness] : no hoarseness [Shortness Of Breath] : no shortness of breath [Wheezing] : no wheezing [Dyspnea on Exertion] : not dyspnea on exertion [Dysuria] : no dysuria [Incontinence] : no incontinence [Hematuria] : no hematuria [FreeTextEntry9] : radiating L gluteal pain down posterior leg

## 2023-01-19 NOTE — INTERPRETER SERVICES
[Time Spent: ____ minutes] : Total time spent using  services: [unfilled] minutes. The patient's primary language is not English thus required  services. [Interpreters_IDNumber] : [id number] [Interpreters_FullName] : Magda [TWNoteComboBox1] : Swazi

## 2023-01-19 NOTE — PHYSICAL EXAM
[No Acute Distress] : no acute distress [Well Nourished] : well nourished [Well Developed] : well developed [Well-Appearing] : well-appearing [Normal Voice/Communication] : normal voice/communication [Normal Sclera/Conjunctiva] : normal sclera/conjunctiva [PERRL] : pupils equal round and reactive to light [EOMI] : extraocular movements intact [Normal Outer Ear/Nose] : the outer ears and nose were normal in appearance [Normal Oropharynx] : the oropharynx was normal [No Lymphadenopathy] : no lymphadenopathy [Supple] : supple [Thyroid Normal, No Nodules] : the thyroid was normal and there were no nodules present [No Respiratory Distress] : no respiratory distress  [No Accessory Muscle Use] : no accessory muscle use [Clear to Auscultation] : lungs were clear to auscultation bilaterally [Normal Rate] : normal rate  [Regular Rhythm] : with a regular rhythm [Normal S1, S2] : normal S1 and S2 [No Murmur] : no murmur heard [No Edema] : there was no peripheral edema [Soft] : abdomen soft [Non Tender] : non-tender [Non-distended] : non-distended [No Masses] : no abdominal mass palpated [Normal Bowel Sounds] : normal bowel sounds [No Spinal Tenderness] : no spinal tenderness [Grossly Normal Strength/Tone] : grossly normal strength/tone [No Rash] : no rash [Normal Gait] : normal gait [Alert and Oriented x3] : oriented to person, place, and time [de-identified] : internal rotation of extended L leg reproduced L gluteal pain radiating down posteriorly. Tender to palpation on area between L posterior superior ilac spine and greater trocanter.

## 2023-01-20 DIAGNOSIS — K21.9 GASTRO-ESOPHAGEAL REFLUX DISEASE WITHOUT ESOPHAGITIS: ICD-10-CM

## 2023-01-20 DIAGNOSIS — R20.2 PARESTHESIA OF SKIN: ICD-10-CM

## 2023-01-20 DIAGNOSIS — R19.6 HALITOSIS: ICD-10-CM

## 2023-01-20 DIAGNOSIS — G57.00 LESION OF SCIATIC NERVE, UNSPECIFIED LOWER LIMB: ICD-10-CM

## 2023-01-26 ENCOUNTER — NON-APPOINTMENT (OUTPATIENT)
Age: 53
End: 2023-01-26

## 2023-01-26 DIAGNOSIS — A69.20 LYME DISEASE, UNSPECIFIED: ICD-10-CM

## 2023-01-28 LAB
A PHAGOCYTOPH IGG TITR SER IF: NORMAL TITER
B BURGDOR AB SER QL IA: POSITIVE
B MICROTI IGG TITR SER: NORMAL TITER
E CHAFFEENSIS IGG TITR SER IF: NORMAL TITER

## 2023-02-16 ENCOUNTER — RESULT REVIEW (OUTPATIENT)
Age: 53
End: 2023-02-16

## 2023-02-16 ENCOUNTER — APPOINTMENT (OUTPATIENT)
Dept: FAMILY MEDICINE | Facility: HOSPITAL | Age: 53
End: 2023-02-16

## 2023-02-16 ENCOUNTER — OUTPATIENT (OUTPATIENT)
Dept: OUTPATIENT SERVICES | Facility: HOSPITAL | Age: 53
LOS: 1 days | End: 2023-02-16
Payer: SELF-PAY

## 2023-02-16 VITALS
TEMPERATURE: 98 F | WEIGHT: 193 LBS | RESPIRATION RATE: 16 BRPM | DIASTOLIC BLOOD PRESSURE: 89 MMHG | SYSTOLIC BLOOD PRESSURE: 144 MMHG | BODY MASS INDEX: 31.15 KG/M2 | HEART RATE: 78 BPM | OXYGEN SATURATION: 96 %

## 2023-02-16 DIAGNOSIS — Z00.00 ENCOUNTER FOR GENERAL ADULT MEDICAL EXAMINATION WITHOUT ABNORMAL FINDINGS: ICD-10-CM

## 2023-02-16 DIAGNOSIS — M25.562 PAIN IN LEFT KNEE: ICD-10-CM

## 2023-02-17 PROCEDURE — 73560 X-RAY EXAM OF KNEE 1 OR 2: CPT

## 2023-02-17 PROCEDURE — 73502 X-RAY EXAM HIP UNI 2-3 VIEWS: CPT

## 2023-02-17 PROCEDURE — G0463: CPT

## 2023-02-17 PROCEDURE — 73502 X-RAY EXAM HIP UNI 2-3 VIEWS: CPT | Mod: 26,LT

## 2023-02-17 PROCEDURE — 73560 X-RAY EXAM OF KNEE 1 OR 2: CPT | Mod: 26,LT

## 2023-02-18 NOTE — PHYSICAL EXAM
[No Acute Distress] : no acute distress [Well Nourished] : well nourished [Well Developed] : well developed [Well-Appearing] : well-appearing [Normal Sclera/Conjunctiva] : normal sclera/conjunctiva [PERRL] : pupils equal round and reactive to light [EOMI] : extraocular movements intact [Normal Outer Ear/Nose] : the outer ears and nose were normal in appearance [No Lymphadenopathy] : no lymphadenopathy [Supple] : supple [Thyroid Normal, No Nodules] : the thyroid was normal and there were no nodules present [No Respiratory Distress] : no respiratory distress  [No Accessory Muscle Use] : no accessory muscle use [Clear to Auscultation] : lungs were clear to auscultation bilaterally [Normal Rate] : normal rate  [Regular Rhythm] : with a regular rhythm [Normal S1, S2] : normal S1 and S2 [No Murmur] : no murmur heard [No Edema] : there was no peripheral edema [Soft] : abdomen soft [Non Tender] : non-tender [Non-distended] : non-distended [No Masses] : no abdominal mass palpated [No Joint Swelling] : no joint swelling [Grossly Normal Strength/Tone] : grossly normal strength/tone [No Rash] : no rash [No Focal Deficits] : no focal deficits [Normal Gait] : normal gait [Alert and Oriented x3] : oriented to person, place, and time [de-identified] : no tenderness to palpation on L hip, L knee, no pain in lumbar spine, A/PROM of L hip and L knee wnl [de-identified] : ALFONZO

## 2023-02-18 NOTE — REVIEW OF SYSTEMS
[Negative] : Heme/Lymph [Fever] : no fever [Chills] : no chills [Fatigue] : no fatigue [Night Sweats] : no night sweats [Recent Change In Weight] : ~T no recent weight change [Abdominal Pain] : no abdominal pain [Nausea] : no nausea [Diarrhea] : no diarrhea [Vomiting] : no vomiting [Heartburn] : no heartburn [Melena] : no melena [FreeTextEntry7] : bad breath  [FreeTextEntry9] :  L hip pain radiating down to foot

## 2023-02-18 NOTE — HISTORY OF PRESENT ILLNESS
[FreeTextEntry1] : f/u piriformis syndrome, halitosis, lyme dz, vit D insufficiency  [de-identified] : 52M with PMH of cecal polyps (hyperplastic), HLD and laryngopharyngeal reflux presents to [facility] for f/u of  lyme dz, halitosis, piriformis syndrome, and vit d deficiency. Pt was placed on doxy 100mg q12hr x 3 weeks for lyme dz. For halitosis, pt was rec to take pantoprazole 40mg qd in the morning on empty stomach and f/u with dentist. Pt on vit D supplement. PT referral was given for piriformis syndrome. \par \par Today, pt reports piriformis syndrome has resolved but now with L hip pain that radiates down to feet. It is stabbing pain and it is worse with walking and with large hip movement. Pt also continues to c/o of bad breath. Pt reports he has been taking pantoprazole as directed with no improvement of bad breath. Pt with no heartburn. Pt was unable to make an appt for a dental visit, as the office did not answer calls or returned his calls. Pt denies n/v, f/c, diarrhea, IBS.  \par

## 2023-02-18 NOTE — INTERPRETER SERVICES
[[employer] ] : provided by [employer]   [Time Spent: ____ minutes] : Total time spent using  services: [unfilled] minutes. The patient's primary language is not English thus required  services. [Interpreters_IDNumber] : [id number] [Interpreters_FullName] : Rajendra  [TWNoteComboBox1] : Malaysian

## 2023-02-21 DIAGNOSIS — M25.562 PAIN IN LEFT KNEE: ICD-10-CM

## 2023-02-21 DIAGNOSIS — M25.552 PAIN IN LEFT HIP: ICD-10-CM

## 2023-02-21 DIAGNOSIS — R19.6 HALITOSIS: ICD-10-CM

## 2023-03-03 RX ORDER — CHLORHEXIDINE GLUCONATE, 0.12% ORAL RINSE 1.2 MG/ML
0.12 SOLUTION DENTAL
Qty: 1 | Refills: 3 | Status: ACTIVE | COMMUNITY
Start: 2023-03-03 | End: 1900-01-01

## 2023-03-29 ENCOUNTER — APPOINTMENT (OUTPATIENT)
Dept: FAMILY MEDICINE | Facility: HOSPITAL | Age: 53
End: 2023-03-29

## 2023-03-29 ENCOUNTER — OUTPATIENT (OUTPATIENT)
Dept: OUTPATIENT SERVICES | Facility: HOSPITAL | Age: 53
LOS: 1 days | End: 2023-03-29
Payer: SELF-PAY

## 2023-03-29 VITALS
WEIGHT: 190 LBS | OXYGEN SATURATION: 99 % | SYSTOLIC BLOOD PRESSURE: 146 MMHG | HEART RATE: 70 BPM | DIASTOLIC BLOOD PRESSURE: 82 MMHG | BODY MASS INDEX: 30.67 KG/M2 | TEMPERATURE: 98.8 F | RESPIRATION RATE: 16 BRPM

## 2023-03-29 DIAGNOSIS — Z00.00 ENCOUNTER FOR GENERAL ADULT MEDICAL EXAMINATION WITHOUT ABNORMAL FINDINGS: ICD-10-CM

## 2023-03-29 DIAGNOSIS — M25.552 PAIN IN LEFT HIP: ICD-10-CM

## 2023-03-29 PROCEDURE — G0463: CPT

## 2023-03-30 DIAGNOSIS — R19.6 HALITOSIS: ICD-10-CM

## 2023-03-30 DIAGNOSIS — M25.552 PAIN IN LEFT HIP: ICD-10-CM

## 2023-03-30 NOTE — REVIEW OF SYSTEMS
[Muscle Pain] : muscle pain [Negative] : Heme/Lymph [Abdominal Pain] : no abdominal pain [Nausea] : no nausea [Constipation] : no constipation [Diarrhea] : no diarrhea [Vomiting] : no vomiting [Heartburn] : no heartburn [Melena] : no melena [Joint Pain] : no joint pain [Joint Stiffness] : no joint stiffness [Muscle Weakness] : no muscle weakness [Back Pain] : no back pain [Joint Swelling] : no joint swelling [FreeTextEntry7] : +bad breath  [FreeTextEntry9] : +L hip pain radiating down to foot

## 2023-03-30 NOTE — PHYSICAL EXAM
[Well Nourished] : well nourished [Well Developed] : well developed [Well-Appearing] : well-appearing [Normal Sclera/Conjunctiva] : normal sclera/conjunctiva [PERRL] : pupils equal round and reactive to light [Normal Outer Ear/Nose] : the outer ears and nose were normal in appearance [Normal Oropharynx] : the oropharynx was normal [No JVD] : no jugular venous distention [No Lymphadenopathy] : no lymphadenopathy [Supple] : supple [Thyroid Normal, No Nodules] : the thyroid was normal and there were no nodules present [No Respiratory Distress] : no respiratory distress  [No Accessory Muscle Use] : no accessory muscle use [Clear to Auscultation] : lungs were clear to auscultation bilaterally [Normal Rate] : normal rate  [Regular Rhythm] : with a regular rhythm [Normal S1, S2] : normal S1 and S2 Prophylactic measure [No Murmur] : no murmur heard [No Edema] : there was no peripheral edema [Soft] : abdomen soft [Non Tender] : non-tender [Non-distended] : non-distended [No Masses] : no abdominal mass palpated [Normal Bowel Sounds] : normal bowel sounds [No CVA Tenderness] : no CVA  tenderness [No Spinal Tenderness] : no spinal tenderness [No Joint Swelling] : no joint swelling [Grossly Normal Strength/Tone] : grossly normal strength/tone [No Rash] : no rash [Coordination Grossly Intact] : coordination grossly intact [No Focal Deficits] : no focal deficits [Normal Gait] : normal gait [Normal Affect] : the affect was normal [Alert and Oriented x3] : oriented to person, place, and time [de-identified] : no tenderness to plapation on L hip

## 2023-03-30 NOTE — END OF VISIT
[] : Resident [FreeTextEntry3] : patient's chart review showed repeat colonoscopy result as tubular adenoma and GI advised to repeat the colonoscopy in 7 years which will be in 2029.

## 2023-03-30 NOTE — INTERPRETER SERVICES
[Time Spent: ____ minutes] : Total time spent using  services: [unfilled] minutes. The patient's primary language is not English thus required  services. [Interpreters_IDNumber] : [id number] [Interpreters_FullName] : Fina [TWNoteComboBox1] : Sri Lankan

## 2023-03-30 NOTE — HISTORY OF PRESENT ILLNESS
[FreeTextEntry1] : f/u for chronic bad breath and radiating L hip pain  [de-identified] : 52M with PMH of cecal polyps (hyperplastic), Lyme dz, HLD, and laryngopharyngeal reflux presents to [facility] for f/u of halitosis and radiating L hip pain and L knee pain.  For halitosis, pt was recommended to take pantoprazole 40mg qd in the morning on empty stomach and f/u with the dentist.Pt reports, he has not seen the dentist yet due to financial reasons. Pt was also recommended to clean his dentures with peridex. Pt reports halitosis improved with PPI tx initially, but feels it as bad as before.  For  L hip pain, xray was ordered and PT referral was provided. Xray of L hip was unremarkable. Pt reports recently starting physical therapy, which has helped with the pain a little, but continues to have L hip pain radiating down to left foot.  Pt schedule for GI for evaluation of halitosis on 4/4.\par \par

## 2023-04-04 ENCOUNTER — OUTPATIENT (OUTPATIENT)
Dept: OUTPATIENT SERVICES | Facility: HOSPITAL | Age: 53
LOS: 1 days | End: 2023-04-04
Payer: COMMERCIAL

## 2023-04-04 ENCOUNTER — APPOINTMENT (OUTPATIENT)
Dept: GASTROENTEROLOGY | Facility: HOSPITAL | Age: 53
End: 2023-04-04
Payer: MEDICAID

## 2023-04-04 VITALS
SYSTOLIC BLOOD PRESSURE: 138 MMHG | BODY MASS INDEX: 31.02 KG/M2 | DIASTOLIC BLOOD PRESSURE: 89 MMHG | HEART RATE: 78 BPM | HEIGHT: 66 IN | RESPIRATION RATE: 14 BRPM | TEMPERATURE: 98 F | WEIGHT: 193 LBS

## 2023-04-04 DIAGNOSIS — R10.9 UNSPECIFIED ABDOMINAL PAIN: ICD-10-CM

## 2023-04-04 DIAGNOSIS — R09.89 OTHER SPECIFIED SYMPTOMS AND SIGNS INVOLVING THE CIRCULATORY AND RESPIRATORY SYSTEMS: ICD-10-CM

## 2023-04-04 DIAGNOSIS — K21.9 GASTRO-ESOPHAGEAL REFLUX DISEASE W/OUT ESOPHAGITIS: ICD-10-CM

## 2023-04-04 PROCEDURE — 82784 ASSAY IGA/IGD/IGG/IGM EACH: CPT

## 2023-04-04 PROCEDURE — 86376 MICROSOMAL ANTIBODY EACH: CPT

## 2023-04-04 PROCEDURE — 86709 HEPATITIS A IGM ANTIBODY: CPT

## 2023-04-04 PROCEDURE — 86704 HEP B CORE ANTIBODY TOTAL: CPT

## 2023-04-04 PROCEDURE — 86038 ANTINUCLEAR ANTIBODIES: CPT

## 2023-04-04 PROCEDURE — 86255 FLUORESCENT ANTIBODY SCREEN: CPT

## 2023-04-04 PROCEDURE — G0463: CPT

## 2023-04-04 PROCEDURE — ZZZZZ: CPT | Mod: GC

## 2023-04-04 PROCEDURE — 80053 COMPREHEN METABOLIC PANEL: CPT

## 2023-04-04 PROCEDURE — 86803 HEPATITIS C AB TEST: CPT

## 2023-04-04 PROCEDURE — 87340 HEPATITIS B SURFACE AG IA: CPT

## 2023-04-04 PROCEDURE — 86706 HEP B SURFACE ANTIBODY: CPT

## 2023-04-04 PROCEDURE — 85025 COMPLETE CBC W/AUTO DIFF WBC: CPT

## 2023-04-04 RX ORDER — PANTOPRAZOLE 40 MG/1
40 TABLET, DELAYED RELEASE ORAL DAILY
Qty: 60 | Refills: 3 | Status: ACTIVE | COMMUNITY
Start: 2021-09-14 | End: 1900-01-01

## 2023-04-05 DIAGNOSIS — R19.6 HALITOSIS: ICD-10-CM

## 2023-04-05 DIAGNOSIS — K21.9 GASTRO-ESOPHAGEAL REFLUX DISEASE WITHOUT ESOPHAGITIS: ICD-10-CM

## 2023-04-05 DIAGNOSIS — E66.9 OBESITY, UNSPECIFIED: ICD-10-CM

## 2023-04-05 DIAGNOSIS — R09.89 OTHER SPECIFIED SYMPTOMS AND SIGNS INVOLVING THE CIRCULATORY AND RESPIRATORY SYSTEMS: ICD-10-CM

## 2023-04-05 LAB
ALBUMIN SERPL ELPH-MCNC: 4.9 G/DL — SIGNIFICANT CHANGE UP (ref 3.3–5)
ALP SERPL-CCNC: 99 U/L — SIGNIFICANT CHANGE UP (ref 40–120)
ALT FLD-CCNC: 53 U/L — HIGH (ref 10–45)
ANION GAP SERPL CALC-SCNC: 14 MMOL/L — SIGNIFICANT CHANGE UP (ref 5–17)
AST SERPL-CCNC: 37 U/L — SIGNIFICANT CHANGE UP (ref 10–40)
BASOPHILS # BLD AUTO: 0.03 K/UL — SIGNIFICANT CHANGE UP (ref 0–0.2)
BASOPHILS NFR BLD AUTO: 0.5 % — SIGNIFICANT CHANGE UP (ref 0–2)
BILIRUB SERPL-MCNC: 0.3 MG/DL — SIGNIFICANT CHANGE UP (ref 0.2–1.2)
BUN SERPL-MCNC: 14 MG/DL — SIGNIFICANT CHANGE UP (ref 7–23)
CALCIUM SERPL-MCNC: 10.1 MG/DL — SIGNIFICANT CHANGE UP (ref 8.4–10.5)
CHLORIDE SERPL-SCNC: 102 MMOL/L — SIGNIFICANT CHANGE UP (ref 96–108)
CO2 SERPL-SCNC: 25 MMOL/L — SIGNIFICANT CHANGE UP (ref 22–31)
CREAT SERPL-MCNC: 0.94 MG/DL — SIGNIFICANT CHANGE UP (ref 0.5–1.3)
EGFR: 96 ML/MIN/1.73M2 — SIGNIFICANT CHANGE UP
EOSINOPHIL # BLD AUTO: 0.07 K/UL — SIGNIFICANT CHANGE UP (ref 0–0.5)
EOSINOPHIL NFR BLD AUTO: 1.1 % — SIGNIFICANT CHANGE UP (ref 0–6)
GLUCOSE SERPL-MCNC: 78 MG/DL — SIGNIFICANT CHANGE UP (ref 70–99)
HAV IGM SER-ACNC: SIGNIFICANT CHANGE UP
HBV CORE AB SER-ACNC: SIGNIFICANT CHANGE UP
HBV SURFACE AB SER-ACNC: SIGNIFICANT CHANGE UP
HBV SURFACE AG SER-ACNC: SIGNIFICANT CHANGE UP
HCT VFR BLD CALC: 47.3 % — SIGNIFICANT CHANGE UP (ref 39–50)
HCV AB S/CO SERPL IA: 0.12 S/CO — SIGNIFICANT CHANGE UP (ref 0–0.99)
HCV AB SERPL-IMP: SIGNIFICANT CHANGE UP
HGB BLD-MCNC: 15.9 G/DL — SIGNIFICANT CHANGE UP (ref 13–17)
IGA FLD-MCNC: 180 MG/DL — SIGNIFICANT CHANGE UP (ref 84–499)
IGG FLD-MCNC: 1564 MG/DL — SIGNIFICANT CHANGE UP (ref 610–1660)
IGM SERPL-MCNC: 42 MG/DL — SIGNIFICANT CHANGE UP (ref 35–242)
IMM GRANULOCYTES NFR BLD AUTO: 0.5 % — SIGNIFICANT CHANGE UP (ref 0–0.9)
KAPPA LC SER QL IFE: 1.62 MG/DL — SIGNIFICANT CHANGE UP (ref 0.33–1.94)
KAPPA/LAMBDA FREE LIGHT CHAIN RATIO, SERUM: 0.9 RATIO — SIGNIFICANT CHANGE UP (ref 0.26–1.65)
LAMBDA LC SER QL IFE: 1.81 MG/DL — SIGNIFICANT CHANGE UP (ref 0.57–2.63)
LKM AB SER-ACNC: <20.1 UNITS — SIGNIFICANT CHANGE UP (ref 0–20)
LYMPHOCYTES # BLD AUTO: 2.4 K/UL — SIGNIFICANT CHANGE UP (ref 1–3.3)
LYMPHOCYTES # BLD AUTO: 36.5 % — SIGNIFICANT CHANGE UP (ref 13–44)
MCHC RBC-ENTMCNC: 30.9 PG — SIGNIFICANT CHANGE UP (ref 27–34)
MCHC RBC-ENTMCNC: 33.6 GM/DL — SIGNIFICANT CHANGE UP (ref 32–36)
MCV RBC AUTO: 91.8 FL — SIGNIFICANT CHANGE UP (ref 80–100)
MONOCYTES # BLD AUTO: 0.56 K/UL — SIGNIFICANT CHANGE UP (ref 0–0.9)
MONOCYTES NFR BLD AUTO: 8.5 % — SIGNIFICANT CHANGE UP (ref 2–14)
NEUTROPHILS # BLD AUTO: 3.49 K/UL — SIGNIFICANT CHANGE UP (ref 1.8–7.4)
NEUTROPHILS NFR BLD AUTO: 52.9 % — SIGNIFICANT CHANGE UP (ref 43–77)
PLATELET # BLD AUTO: 345 K/UL — SIGNIFICANT CHANGE UP (ref 150–400)
POTASSIUM SERPL-MCNC: 4.3 MMOL/L — SIGNIFICANT CHANGE UP (ref 3.5–5.3)
POTASSIUM SERPL-SCNC: 4.3 MMOL/L — SIGNIFICANT CHANGE UP (ref 3.5–5.3)
PROT SERPL-MCNC: 7.5 G/DL — SIGNIFICANT CHANGE UP (ref 6–8.3)
RBC # BLD: 5.15 M/UL — SIGNIFICANT CHANGE UP (ref 4.2–5.8)
RBC # FLD: 13.2 % — SIGNIFICANT CHANGE UP (ref 10.3–14.5)
SMOOTH MUSCLE AB SER-ACNC: ABNORMAL
SODIUM SERPL-SCNC: 141 MMOL/L — SIGNIFICANT CHANGE UP (ref 135–145)
WBC # BLD: 6.58 K/UL — SIGNIFICANT CHANGE UP (ref 3.8–10.5)
WBC # FLD AUTO: 6.58 K/UL — SIGNIFICANT CHANGE UP (ref 3.8–10.5)

## 2023-04-05 NOTE — REASON FOR VISIT
[Follow-up] : a follow-up of an existing diagnosis [Follow-Up: _____] : a [unfilled] follow-up visit [FreeTextEntry1] : halitosis, "throat closing"

## 2023-04-05 NOTE — END OF VISIT
[] : Fellow [FreeTextEntry3] : As modified and discussed with patient\par MD GRAZYNA Hernandez [facility]\par Associate Professor of Medicine\par [facility] [employer]\par

## 2023-04-05 NOTE — ASSESSMENT
[FreeTextEntry1] : 52M w/ PMHx HLD, ??LPR here for follow up of halitosis and throat symptoms\par \par \par Impression: \par #Halitosis \par #Symptoms of "throat closing"\par #? Dx LPR \par pt reporting on-going halitosis, symptoms of "throat closing" (without odynophagia/dysphagia,heartburn/reflux). Previously underwent EGD (2018) without Zenker's or esophagitis seen. Had undergone evaluation by ENT, found to have mild LPR on laryngoscopy. Pt since then has been on PPI (reports symptoms worsen when he does not take PPI), which does not completely control symptoms. Will need to confirm if patient's symptoms are truly reflective of LPR with pH impedance testing \par #Obesity BMI 31\par #Constipation \par \par #Elevated liver enzymes: mild AST/ALT elevations to 50s in 2021. Will r/o viral hepatitis, AIH vs suspect may be in setting of NAFLD. \par \par # CRC screening: due in 7 years (one 3mm TA removed in 2022)\par \par Recommendations:\par - pH impedance testing\par - pantoprazole 40mg qd\par - Dental follow-up for halitosis\par - fiber 35g daily --> then encouraged adding miralax daily if no improvement in constipation with fiber\par - repeat CMP, CBC, acute viral A/B/C testing, [place], ASMA, AMA, anti-LKM, immunoglobulins\par - Obtain RUQ US liver\par - CRC screening due in 2029\par \par Tito Jose MD\par GI Fellow PGY6

## 2023-04-05 NOTE — HISTORY OF PRESENT ILLNESS
[Heartburn] : denies heartburn [Nausea] : denies nausea [Vomiting] : denies vomiting [Diarrhea] : denies diarrhea [Constipation] : denies constipation [Yellow Skin Or Eyes (Jaundice)] : denies jaundice [Abdominal Pain] : denies abdominal pain [Abdominal Swelling] : denies abdominal swelling [Rectal Pain] : denies rectal pain [de-identified] : 52M w/ PMHx HLD, ??LPR here for follow up of halitosis and throat symptoms\par \par Pt reports similar issues as noted in clinic visit in 2021. Endorses 2 main bothersome symptoms include 1. bad breath (he feels this occurs daily/is constant) and 2. feels like his "throat is closing." Reports he also experiences phlegm and occasionally voice hoarseness. Denies heartburn/reflux, odynophagia, dysphagia, weight loss, abdominal pain, n/v, cough, choking, SOB, chest pain. Reports he currently takes PPI (pantoprazole 40mg daily) 30 min before dinner -- reports symptoms are worse if he comes off PPI. \par \par Also reports constipation (straining to have hard BMs). Denies hematochezia/melena. Does not take any fiber/miralax at this time. \par \par Previous GI Hx: \par Seen by GI 3/2018 w/ complaint of halitosis. 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 Patient was prescribed PPI, which he took w/ some improvement of symptoms. Also was seen by ENT at the time, underwent laryngoscopy w/ findings c/f mild LPR, given PPI/H2B.\par \par Patient underwent colonoscopy 2018 w/ cecal polyp (hyperplastic), however prep was inadequate and recommended repeat colonoscopy within 1 year.

## 2023-04-06 LAB
ANA PAT FLD IF-IMP: ABNORMAL
ANA TITR SER: ABNORMAL

## 2023-04-26 ENCOUNTER — OUTPATIENT (OUTPATIENT)
Dept: OUTPATIENT SERVICES | Facility: HOSPITAL | Age: 53
LOS: 1 days | Discharge: HOME | End: 2023-04-26
Payer: COMMERCIAL

## 2023-04-26 ENCOUNTER — TRANSCRIPTION ENCOUNTER (OUTPATIENT)
Age: 53
End: 2023-04-26

## 2023-04-26 VITALS
TEMPERATURE: 98 F | OXYGEN SATURATION: 98 % | WEIGHT: 192.9 LBS | HEIGHT: 68 IN | HEART RATE: 68 BPM | RESPIRATION RATE: 20 BRPM | DIASTOLIC BLOOD PRESSURE: 86 MMHG | SYSTOLIC BLOOD PRESSURE: 152 MMHG

## 2023-04-26 DIAGNOSIS — Z90.49 ACQUIRED ABSENCE OF OTHER SPECIFIED PARTS OF DIGESTIVE TRACT: Chronic | ICD-10-CM

## 2023-04-26 DIAGNOSIS — K21.9 GASTRO-ESOPHAGEAL REFLUX DISEASE WITHOUT ESOPHAGITIS: ICD-10-CM

## 2023-04-26 PROCEDURE — 91010 ESOPHAGUS MOTILITY STUDY: CPT | Mod: 26

## 2023-04-26 PROCEDURE — 91038 ESOPH IMPED FUNCT TEST > 1HR: CPT

## 2023-04-26 PROCEDURE — C1889: CPT

## 2023-04-26 PROCEDURE — 91037 ESOPH IMPED FUNCTION TEST: CPT | Mod: 26

## 2023-04-26 DEVICE — CATH VERSAFLEX Z PH: Type: IMPLANTABLE DEVICE | Status: FUNCTIONAL

## 2023-04-26 RX ORDER — PANTOPRAZOLE SODIUM 20 MG/1
1 TABLET, DELAYED RELEASE ORAL
Refills: 0 | DISCHARGE

## 2023-04-26 NOTE — PRE PROCEDURE NOTE - PRE PROCEDURE EVALUATION
Attending Physician:              Mt Hayward MD MSEd  Indication for Procedure:      LPRD, Halitosis                PAST MEDICAL & SURGICAL HISTORY:  No pertinent past medical history      History of appendectomy            See Allscripts Note for further details  ALLERGIES:  No Known Allergies    HOME MEDICATIONS:  pantoprazole 40 mg oral delayed release tablet: 1 orally once a day      See Allscripts Note for further details    AICD/PPM: [ ] yes   [ x] no    PERTINENT LAB DATA:                      PHYSICAL EXAMINATION:    Height (cm): 172.7  Weight (kg): 87.5  BMI (kg/m2): 29.3  BSA (m2): 2.01T(C): --  HR: --  BP: --  RR: --  SpO2: --    Constitutional: NAD  HEENT: PERRLA, EOMI,    Neck:  No JVD  Respiratory: CTAB/L  Cardiovascular: S1 and S2  Gastrointestinal: BS+, soft, NT/ND  Extremities: No peripheral edema  Neurological: A/O x 3, no focal deficits  Psychiatric: Normal mood, normal affect  Skin: No rashes    ASA Class: I [ ]  II [x ]  III [ ]  IV [ ]    COMMENTS:    The patient is a suitable candidate for the planned procedure unless box checked [ ]  No, explain:

## 2023-04-26 NOTE — ASU DISCHARGE PLAN (ADULT/PEDIATRIC) - NS MD DC FALL RISK RISK
For information on Fall & Injury Prevention, visit: [email or web address] OR  [email or web address] OR  https://www.cdc.gov/steadi/patient.html

## 2023-04-27 PROCEDURE — 91038 ESOPH IMPED FUNCT TEST > 1HR: CPT | Mod: 26

## 2024-03-12 NOTE — PRE PROCEDURE NOTE - PRE PROCEDURE EVALUATION
ERP at bedside.                                                                                                  Pre-Endoscopy Evaluation    Attending Physician: SYLVESTER Shannon    Procedure:  Colonoscopy     Indication for Procedure: Screening    Pertinent History: See Allscripts chart    PAST MEDICAL & SURGICAL HISTORY:  No pertinent past medical history        Allergies    No Known Allergies    Intolerances        Medications: MEDICATIONS  (STANDING):    MEDICATIONS  (PRN):      Pertinent lab data:                      Physical Examination:  Daily     Daily   Vital Signs Last 24 Hrs  T(C): --  T(F): --  HR: --  BP: --  BP(mean): --  RR: --  SpO2: --  Constitutional: NAD  HEENT: PERRLA, EOMI,    Neck:  No JVD  Respiratory: CTAB/L  Cardiovascular: S1 and S2  Gastrointestinal: BS+, soft, NT/ND  Extremities: No peripheral edema  Neurological: A/O x 3, no focal deficits  Psychiatric: Normal mood, normal affect  : No Hilario  Skin: No rashes    Comments:    ASA Class: I []  II []  III []  IV []    The patient is a suitable candidate for the planned procedure unless box checked [ ]  No, explain:

## 2024-04-16 ENCOUNTER — APPOINTMENT (OUTPATIENT)
Dept: FAMILY MEDICINE | Facility: HOSPITAL | Age: 54
End: 2024-04-16

## 2024-04-16 ENCOUNTER — OUTPATIENT (OUTPATIENT)
Dept: OUTPATIENT SERVICES | Facility: HOSPITAL | Age: 54
LOS: 1 days | End: 2024-04-16
Payer: COMMERCIAL

## 2024-04-16 VITALS
SYSTOLIC BLOOD PRESSURE: 132 MMHG | RESPIRATION RATE: 14 BRPM | BODY MASS INDEX: 30.22 KG/M2 | OXYGEN SATURATION: 98 % | DIASTOLIC BLOOD PRESSURE: 85 MMHG | WEIGHT: 188 LBS | HEART RATE: 68 BPM | HEIGHT: 66 IN | TEMPERATURE: 97.6 F

## 2024-04-16 DIAGNOSIS — Z90.49 ACQUIRED ABSENCE OF OTHER SPECIFIED PARTS OF DIGESTIVE TRACT: Chronic | ICD-10-CM

## 2024-04-16 DIAGNOSIS — E66.9 OBESITY, UNSPECIFIED: ICD-10-CM

## 2024-04-16 DIAGNOSIS — E78.5 HYPERLIPIDEMIA, UNSPECIFIED: ICD-10-CM

## 2024-04-16 DIAGNOSIS — Z00.00 ENCOUNTER FOR GENERAL ADULT MEDICAL EXAMINATION WITHOUT ABNORMAL FINDINGS: ICD-10-CM

## 2024-04-16 DIAGNOSIS — Z12.11 ENCOUNTER FOR SCREENING FOR MALIGNANT NEOPLASM OF COLON: ICD-10-CM

## 2024-04-16 DIAGNOSIS — Z00.00 ENCOUNTER FOR GENERAL ADULT MEDICAL EXAMINATION W/OUT ABNORMAL FINDINGS: ICD-10-CM

## 2024-04-16 DIAGNOSIS — E55.9 VITAMIN D DEFICIENCY, UNSPECIFIED: ICD-10-CM

## 2024-04-17 PROBLEM — E55.9 VITAMIN D INSUFFICIENCY: Status: RESOLVED | Noted: 2021-04-13 | Resolved: 2024-04-17

## 2024-04-17 PROBLEM — Z00.00 ANNUAL PHYSICAL EXAM: Status: ACTIVE | Noted: 2024-04-16

## 2024-04-17 PROBLEM — E66.9 OBESITY, CLASS I, BMI 30-34.9: Status: ACTIVE | Noted: 2023-04-05

## 2024-04-17 PROBLEM — Z12.11 SCREENING FOR COLON CANCER: Status: ACTIVE | Noted: 2024-04-16 | Resolved: 2024-05-16

## 2024-04-17 PROBLEM — E78.5 DYSLIPIDEMIA: Status: ACTIVE | Noted: 2024-04-17

## 2024-04-17 NOTE — COUNSELING
[Potential consequences of obesity discussed] : Potential consequences of obesity discussed [Benefits of weight loss discussed] : Benefits of weight loss discussed [Encouraged to increase physical activity] : Encouraged to increase physical activity [Target Wt Loss Goal ___] : Weight Loss Goals: Target weight loss goal [unfilled] lbs [Weigh Self Weekly] : weigh self weekly [____ min/wk Activity] : [unfilled] min/wk activity

## 2024-04-18 DIAGNOSIS — R20.2 PARESTHESIA OF SKIN: ICD-10-CM

## 2024-04-18 DIAGNOSIS — R19.6 HALITOSIS: ICD-10-CM

## 2024-04-18 DIAGNOSIS — E66.9 OBESITY, UNSPECIFIED: ICD-10-CM

## 2024-04-18 PROCEDURE — G0463: CPT

## 2024-04-18 PROCEDURE — 36415 COLL VENOUS BLD VENIPUNCTURE: CPT

## 2024-04-18 PROCEDURE — 80061 LIPID PANEL: CPT

## 2024-04-18 PROCEDURE — 80053 COMPREHEN METABOLIC PANEL: CPT

## 2024-04-18 PROCEDURE — 84443 ASSAY THYROID STIM HORMONE: CPT

## 2024-04-18 PROCEDURE — 82607 VITAMIN B-12: CPT

## 2024-04-18 PROCEDURE — 83036 HEMOGLOBIN GLYCOSYLATED A1C: CPT

## 2024-04-18 PROCEDURE — 82274 ASSAY TEST FOR BLOOD FECAL: CPT

## 2024-04-18 PROCEDURE — 85025 COMPLETE CBC W/AUTO DIFF WBC: CPT

## 2024-04-18 PROCEDURE — 82306 VITAMIN D 25 HYDROXY: CPT

## 2024-04-23 ENCOUNTER — NON-APPOINTMENT (OUTPATIENT)
Age: 54
End: 2024-04-23

## 2024-04-23 LAB
25(OH)D3 SERPL-MCNC: 19.8 NG/ML
ALBUMIN SERPL ELPH-MCNC: 4.5 G/DL
ALP BLD-CCNC: 96 U/L
ALT SERPL-CCNC: 41 U/L
ANION GAP SERPL CALC-SCNC: 13 MMOL/L
AST SERPL-CCNC: 32 U/L
BASOPHILS # BLD AUTO: 0.02 K/UL
BASOPHILS NFR BLD AUTO: 0.4 %
BILIRUB SERPL-MCNC: 0.4 MG/DL
BUN SERPL-MCNC: 14 MG/DL
CALCIUM SERPL-MCNC: 9.5 MG/DL
CHLORIDE SERPL-SCNC: 106 MMOL/L
CHOLEST SERPL-MCNC: 186 MG/DL
CO2 SERPL-SCNC: 21 MMOL/L
CREAT SERPL-MCNC: 0.72 MG/DL
EGFR: 108 ML/MIN/1.73M2
EOSINOPHIL # BLD AUTO: 0.11 K/UL
EOSINOPHIL NFR BLD AUTO: 2.4 %
ESTIMATED AVERAGE GLUCOSE: 117 MG/DL
GLUCOSE SERPL-MCNC: 109 MG/DL
HBA1C MFR BLD HPLC: 5.7 %
HCT VFR BLD CALC: 44.3 %
HDLC SERPL-MCNC: 35 MG/DL
HEMOCCULT STL QL IA: NEGATIVE
HGB BLD-MCNC: 15.4 G/DL
IMM GRANULOCYTES NFR BLD AUTO: 0.2 %
LDLC SERPL CALC-MCNC: 118 MG/DL
LYMPHOCYTES # BLD AUTO: 1.47 K/UL
LYMPHOCYTES NFR BLD AUTO: 32.6 %
MAN DIFF?: NORMAL
MCHC RBC-ENTMCNC: 30.7 PG
MCHC RBC-ENTMCNC: 34.8 GM/DL
MCV RBC AUTO: 88.2 FL
MONOCYTES # BLD AUTO: 0.34 K/UL
MONOCYTES NFR BLD AUTO: 7.5 %
NEUTROPHILS # BLD AUTO: 2.56 K/UL
NEUTROPHILS NFR BLD AUTO: 56.9 %
NONHDLC SERPL-MCNC: 151 MG/DL
PLATELET # BLD AUTO: 309 K/UL
POTASSIUM SERPL-SCNC: 4.1 MMOL/L
PROT SERPL-MCNC: 7.2 G/DL
RBC # BLD: 5.02 M/UL
RBC # FLD: 13.3 %
SODIUM SERPL-SCNC: 140 MMOL/L
TRIGL SERPL-MCNC: 182 MG/DL
TSH SERPL-ACNC: 2.7 UIU/ML
VIT B12 SERPL-MCNC: 348 PG/ML
WBC # FLD AUTO: 4.51 K/UL

## 2024-04-23 RX ORDER — PANTOPRAZOLE SODIUM 40 MG/1
40 GRANULE, DELAYED RELEASE ORAL
Qty: 1 | Refills: 3 | Status: DISCONTINUED | COMMUNITY
Start: 2021-03-24 | End: 2024-04-23

## 2024-04-23 RX ORDER — DOXYCYCLINE HYCLATE 100 MG/1
100 CAPSULE ORAL
Qty: 42 | Refills: 0 | Status: COMPLETED | COMMUNITY
Start: 2023-01-26 | End: 2024-02-18

## 2024-04-29 NOTE — PHYSICAL EXAM
[No Acute Distress] : no acute distress [Well Nourished] : well nourished [Well Developed] : well developed [Well-Appearing] : well-appearing [Normal Sclera/Conjunctiva] : normal sclera/conjunctiva [PERRL] : pupils equal round and reactive to light [EOMI] : extraocular movements intact [Normal Outer Ear/Nose] : the outer ears and nose were normal in appearance [Normal Oropharynx] : the oropharynx was normal [Normal TMs] : both tympanic membranes were normal [Normal Nasal Mucosa] : the nasal mucosa was normal [No JVD] : no jugular venous distention [No Lymphadenopathy] : no lymphadenopathy [Supple] : supple [Thyroid Normal, No Nodules] : the thyroid was normal and there were no nodules present [No Respiratory Distress] : no respiratory distress  [No Accessory Muscle Use] : no accessory muscle use [Clear to Auscultation] : lungs were clear to auscultation bilaterally [Normal Rate] : normal rate  [Regular Rhythm] : with a regular rhythm [Normal S1, S2] : normal S1 and S2 [No Murmur] : no murmur heard [No Carotid Bruits] : no carotid bruits [Pedal Pulses Present] : the pedal pulses are present [No Edema] : there was no peripheral edema [No Extremity Clubbing/Cyanosis] : no extremity clubbing/cyanosis [Soft] : abdomen soft [Non Tender] : non-tender [Non-distended] : non-distended [No Masses] : no abdominal mass palpated [Normal Bowel Sounds] : normal bowel sounds [No Spinal Tenderness] : no spinal tenderness [No Joint Swelling] : no joint swelling [Grossly Normal Strength/Tone] : grossly normal strength/tone [No Rash] : no rash [No Skin Lesions] : no skin lesions [Coordination Grossly Intact] : coordination grossly intact [Normal Gait] : normal gait [Deep Tendon Reflexes (DTR)] : deep tendon reflexes were 2+ and symmetric [Speech Grossly Normal] : speech grossly normal [Normal Affect] : the affect was normal [Alert and Oriented x3] : oriented to person, place, and time [Normal Mood] : the mood was normal [Normal Insight/Judgement] : insight and judgment were intact [No Focal Deficits] : no focal deficits [de-identified] : central lower incisors and right lower premolar and molar teeth missing; denture on upper teeth [de-identified] : left hip passive and active ROM wnl, MMT left hip flexion 5/5, left knee flexion 5/5, left knee extension 5/5  [de-identified] : sensation to light touch intact b/l on face and all extremities; no facial droop

## 2024-04-29 NOTE — HISTORY OF PRESENT ILLNESS
[FreeTextEntry1] : CPE [de-identified] : 52M with PMH with Lyme Dz (1/2023, s/p doxy), dyslipidemia, halitosis, and laryngopharyngeal reflux here for CPE. Pt c/o tingling sensation "all over the body" that comes and goes past several months. Pt reports it doesn't feel numb. No aggravating or relieving factors. Has not taken any medication for symptom relief. Pt denies fever, chills, cough, runny nose, diarrhea, headache, fatigue, muscle aches, new onset of joint pain, rash/skin lesions, chest pain, SOB. Pt continues to have bad breath.   #halitosis: Previously underwent EGD (2018) w/o Zenker's or esophagitis. Previous ENT eval revealed mild LPR on laryngoscopy. Minimal improvement of halitosis with PPI. Recommended denture hygiene using peridex. Seen by GI 4/2023, pH impedance testing was ordered to confirm if patient's symptoms truely reflective of LPR. The pH impedance test was negative (4/26/23). High-Resolution Esophageal Motility Study showed ineffective esophageal mobility with borderline peristaltic reserve (4/26/23). Pt did not follow up after the tests. Pt continues to have halitosis. Pt seen by dentist, trying to save up money to have his teeth pull out.

## 2024-04-29 NOTE — REVIEW OF SYSTEMS
[Joint Pain] : joint pain [Fever] : no fever [Chills] : no chills [Fatigue] : no fatigue [Night Sweats] : no night sweats [Recent Change In Weight] : ~T no recent weight change [Discharge] : no discharge [Pain] : no pain [Vision Problems] : no vision problems [Itching] : no itching [Earache] : no earache [Postnasal Drip] : no postnasal drip [Nasal Discharge] : no nasal discharge [Sore Throat] : no sore throat [Hoarseness] : no hoarseness [Chest Pain] : no chest pain [Palpitations] : no palpitations [Lower Ext Edema] : no lower extremity edema [Orthopena] : no orthopnea [Paroxysmal Nocturnal Dyspnea] : no paroxysmal nocturnal dyspnea [Shortness Of Breath] : no shortness of breath [Wheezing] : no wheezing [Cough] : no cough [Dyspnea on Exertion] : not dyspnea on exertion [Abdominal Pain] : no abdominal pain [Nausea] : no nausea [Diarrhea] : no diarrhea [Vomiting] : no vomiting [Heartburn] : no heartburn [Melena] : no melena [Dysuria] : no dysuria [Incontinence] : no incontinence [Hematuria] : no hematuria [Muscle Pain] : no muscle pain [Back Pain] : no back pain [Joint Swelling] : no joint swelling [Itching] : no itching [Skin Rash] : no skin rash [Headache] : no headache [Dizziness] : no dizziness [Fainting] : no fainting [Confusion] : no confusion [Unsteady Walk] : no ataxia [Insomnia] : no insomnia [Depression] : no depression [Easy Bleeding] : no easy bleeding [Easy Bruising] : no easy bruising

## 2024-04-29 NOTE — HEALTH RISK ASSESSMENT
[Good] : ~his/her~  mood as  good [Yes] : Yes [Monthly or less (1 pt)] : Monthly or less (1 point) [1 or 2 (0 pts)] : 1 or 2 (0 points) [No] : In the past 12 months have you used drugs other than those required for medical reasons? No [Never (0 pts)] : Never (0 points) [0] : 2) Feeling down, depressed, or hopeless: Not at all (0) [PHQ-2 Negative - No further assessment needed] : PHQ-2 Negative - No further assessment needed [With Family] : lives with family [Employed] : employed [] :  [# Of Children ___] : has [unfilled] children [Sexually Active] : sexually active [Fully functional (bathing, dressing, toileting, transferring, walking, feeding)] : Fully functional (bathing, dressing, toileting, transferring, walking, feeding) [Fully functional (using the telephone, shopping, preparing meals, housekeeping, doing laundry, using] : Fully functional and needs no help or supervision to perform IADLs (using the telephone, shopping, preparing meals, housekeeping, doing laundry, using transportation, managing medications and managing finances) [Reports normal functional visual acuity (ie: able to read med bottle)] : Reports normal functional visual acuity [Reports changes in dental health] : Reports changes in dental health [Never] : Never [de-identified] : well balance diet  [[id number]] : 0 [High Risk Behavior] : no high risk behavior [Reports changes in hearing] : Reports no changes in hearing [Reports changes in vision] : Reports no changes in vision [ColonoscopyComments] : fit neg [de-identified] : w/ wife [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

## 2024-05-21 ENCOUNTER — OUTPATIENT (OUTPATIENT)
Dept: OUTPATIENT SERVICES | Facility: HOSPITAL | Age: 54
LOS: 1 days | End: 2024-05-21
Payer: COMMERCIAL

## 2024-05-21 ENCOUNTER — APPOINTMENT (OUTPATIENT)
Dept: FAMILY MEDICINE | Facility: HOSPITAL | Age: 54
End: 2024-05-21

## 2024-05-21 VITALS
TEMPERATURE: 97.9 F | RESPIRATION RATE: 16 BRPM | OXYGEN SATURATION: 96 % | HEART RATE: 90 BPM | DIASTOLIC BLOOD PRESSURE: 84 MMHG | SYSTOLIC BLOOD PRESSURE: 128 MMHG

## 2024-05-21 DIAGNOSIS — E56.9 VITAMIN DEFICIENCY, UNSPECIFIED: ICD-10-CM

## 2024-05-21 DIAGNOSIS — E78.5 HYPERLIPIDEMIA, UNSPECIFIED: ICD-10-CM

## 2024-05-21 DIAGNOSIS — R20.2 PARESTHESIA OF SKIN: ICD-10-CM

## 2024-05-21 DIAGNOSIS — R19.6 HALITOSIS: ICD-10-CM

## 2024-05-21 DIAGNOSIS — E55.9 VITAMIN D DEFICIENCY, UNSPECIFIED: ICD-10-CM

## 2024-05-21 DIAGNOSIS — Z90.49 ACQUIRED ABSENCE OF OTHER SPECIFIED PARTS OF DIGESTIVE TRACT: Chronic | ICD-10-CM

## 2024-05-21 DIAGNOSIS — Z00.00 ENCOUNTER FOR GENERAL ADULT MEDICAL EXAMINATION WITHOUT ABNORMAL FINDINGS: ICD-10-CM

## 2024-05-21 PROCEDURE — G0463: CPT

## 2024-05-21 RX ORDER — CHOLECALCIFEROL (VITAMIN D3) 1250 MCG
1.25 MG CAPSULE ORAL
Qty: 8 | Refills: 1 | Status: ACTIVE | COMMUNITY
Start: 2024-05-21 | End: 1900-01-01

## 2024-05-21 NOTE — PHYSICAL EXAM
[No Acute Distress] : no acute distress [Well Nourished] : well nourished [Well Developed] : well developed [Well-Appearing] : well-appearing [Normal Oropharynx] : the oropharynx was normal [No Lymphadenopathy] : no lymphadenopathy [Supple] : supple [No Respiratory Distress] : no respiratory distress  [No Accessory Muscle Use] : no accessory muscle use [Clear to Auscultation] : lungs were clear to auscultation bilaterally [Normal Rate] : normal rate  [Regular Rhythm] : with a regular rhythm [Normal S1, S2] : normal S1 and S2 [No Murmur] : no murmur heard [Soft] : abdomen soft [Non Tender] : non-tender [Non-distended] : non-distended [Normal Bowel Sounds] : normal bowel sounds [No Spinal Tenderness] : no spinal tenderness [No Joint Swelling] : no joint swelling [Grossly Normal Strength/Tone] : grossly normal strength/tone [No Rash] : no rash [No Focal Deficits] : no focal deficits [Normal Gait] : normal gait [Deep Tendon Reflexes (DTR)] : deep tendon reflexes were 2+ and symmetric [Alert and Oriented x3] : oriented to person, place, and time

## 2024-05-29 NOTE — HISTORY OF PRESENT ILLNESS
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   #halitosis: chronic, had extensive work up by GI. No Zenker's seen on EGD 2018. Previousl ENT eval revealed mild LPR on laryngoscopy, pH impedance test was done to confirm if patient's symptoms truly reflective of LPR, however the pH impedance test was negative (4/26/23). High-Resolution Esophageal Motility Study showed ineffective esophageal mobility with borderline peristaltic reserve (4/26/23), maybe the cause halitosis.  Pt was advised to f/u with GI for further management as he has not seen GI since the pH impedance test and   Esophageal Motility Study. Pt has not seen GI yet. Pt reports the bad breath has significantly improved with Pandontax toothpaste and Peroxyl mouth wash.   #tingling sensation: pt was last seen on 4/16 c/o diffuse tingling sensation "all over the body". s/p doxy 1/2023 due to Lyme Dz. B12 wnl. Pt reports the tingling sensation has improved since the last visit. Pt denies fever, chills, cough, runny nose, diarrhea, headache, fatigue, muscle aches, new onset of joint pain, rash/skin lesions, chest pain, SOB.  #HLD: triglyceride 182, cholesterol 186, HDL 35, , ASCVD 10 year risk fo 7.4% (4/18/24)   #Vit D def: 19.8 (4/18/24)   #prediabetes; a1c 5.7 (4/18/24)

## 2024-05-29 NOTE — REVIEW OF SYSTEMS
[Fever] : no fever [Chills] : no chills [Postnasal Drip] : no postnasal drip [Nasal Discharge] : no nasal discharge [Sore Throat] : no sore throat [Hoarseness] : no hoarseness [Chest Pain] : no chest pain [Palpitations] : no palpitations [Shortness Of Breath] : no shortness of breath [Abdominal Pain] : no abdominal pain [Nausea] : no nausea [Vomiting] : no vomiting [Heartburn] : no heartburn [Joint Pain] : no joint pain [Joint Stiffness] : no joint stiffness [Joint Swelling] : no joint swelling [Itching] : no itching [Skin Rash] : no skin rash [Headache] : no headache [Dizziness] : no dizziness [Unsteady Walk] : no ataxia [de-identified] : +tingling sensation (improving)

## 2024-09-28 NOTE — ED PROVIDER NOTE - CARE PLAN
1 ED Course as of 09/28/24 0016   Sat Sep 28, 2024   0015 I received a call from [facility] trauma service resident requesting recon CT scans of the thoracic and lumbar spine orders are placed so patient does not have to undergo further radiation with repeat imaging.  Orders were placed as discussed [NP]      ED Course User Index  [NP] Mckenna Clay MD Payne, Nicole M, MD  09/28/24 0016     Principal Discharge DX:	Encounter for medical screening examination

## 2025-04-30 ENCOUNTER — OUTPATIENT (OUTPATIENT)
Dept: OUTPATIENT SERVICES | Facility: HOSPITAL | Age: 55
LOS: 1 days | End: 2025-04-30
Payer: SELF-PAY

## 2025-04-30 ENCOUNTER — APPOINTMENT (OUTPATIENT)
Age: 55
End: 2025-04-30

## 2025-04-30 VITALS
HEART RATE: 73 BPM | WEIGHT: 181 LBS | TEMPERATURE: 98.3 F | HEIGHT: 66 IN | DIASTOLIC BLOOD PRESSURE: 87 MMHG | OXYGEN SATURATION: 98 % | BODY MASS INDEX: 29.09 KG/M2 | SYSTOLIC BLOOD PRESSURE: 136 MMHG | RESPIRATION RATE: 14 BRPM

## 2025-04-30 DIAGNOSIS — E66.3 OVERWEIGHT: ICD-10-CM

## 2025-04-30 DIAGNOSIS — Z00.00 ENCOUNTER FOR GENERAL ADULT MEDICAL EXAMINATION WITHOUT ABNORMAL FINDINGS: ICD-10-CM

## 2025-04-30 DIAGNOSIS — Z00.00 ENCOUNTER FOR GENERAL ADULT MEDICAL EXAMINATION W/OUT ABNORMAL FINDINGS: ICD-10-CM

## 2025-04-30 DIAGNOSIS — L29.9 PRURITUS, UNSPECIFIED: ICD-10-CM

## 2025-04-30 DIAGNOSIS — Z13.0 ENCOUNTER FOR SCREENING FOR DISEASES OF THE BLOOD AND BLOOD-FORMING ORGANS AND CERTAIN DISORDERS INVOLVING THE IMMUNE MECHANISM: ICD-10-CM

## 2025-04-30 DIAGNOSIS — Z90.49 ACQUIRED ABSENCE OF OTHER SPECIFIED PARTS OF DIGESTIVE TRACT: Chronic | ICD-10-CM

## 2025-04-30 DIAGNOSIS — Z29.9 ENCOUNTER FOR PROPHYLACTIC MEASURES, UNSPECIFIED: ICD-10-CM

## 2025-04-30 DIAGNOSIS — Z23 ENCOUNTER FOR IMMUNIZATION: ICD-10-CM

## 2025-05-01 PROBLEM — L29.9 ITCHING: Status: ACTIVE | Noted: 2025-04-30

## 2025-05-01 PROBLEM — Z13.0 SCREENING FOR DEFICIENCY ANEMIA: Status: ACTIVE | Noted: 2025-04-30

## 2025-05-01 PROBLEM — E66.3 OVERWEIGHT: Status: ACTIVE | Noted: 2025-04-30

## 2025-05-02 RX ORDER — LORATADINE 10 MG
10 CAPSULE ORAL
Qty: 30 | Refills: 0 | Status: DISCONTINUED | COMMUNITY
Start: 2025-04-30 | End: 2025-05-02

## 2025-05-05 PROCEDURE — 84443 ASSAY THYROID STIM HORMONE: CPT

## 2025-05-05 PROCEDURE — 36415 COLL VENOUS BLD VENIPUNCTURE: CPT

## 2025-05-05 PROCEDURE — 80053 COMPREHEN METABOLIC PANEL: CPT

## 2025-05-05 PROCEDURE — 80061 LIPID PANEL: CPT

## 2025-05-05 PROCEDURE — 83036 HEMOGLOBIN GLYCOSYLATED A1C: CPT

## 2025-05-05 PROCEDURE — 82239 BILE ACIDS TOTAL: CPT

## 2025-05-05 PROCEDURE — 82306 VITAMIN D 25 HYDROXY: CPT

## 2025-05-05 PROCEDURE — 85025 COMPLETE CBC W/AUTO DIFF WBC: CPT

## 2025-05-05 PROCEDURE — G0463: CPT

## 2025-05-09 LAB
25(OH)D3 SERPL-MCNC: 19.7 NG/ML
ALBUMIN SERPL ELPH-MCNC: 4.5 G/DL
ALP BLD-CCNC: 86 U/L
ALT SERPL-CCNC: 41 U/L
ANION GAP SERPL CALC-SCNC: 12 MMOL/L
AST SERPL-CCNC: 29 U/L
BASOPHILS # BLD AUTO: 0.02 K/UL
BASOPHILS NFR BLD AUTO: 0.4 %
BILE AC SER-MCNC: 1.8 UMOL/L
BILIRUB SERPL-MCNC: 0.4 MG/DL
BUN SERPL-MCNC: 9 MG/DL
CALCIUM SERPL-MCNC: 9.3 MG/DL
CHLORIDE SERPL-SCNC: 107 MMOL/L
CHOLEST SERPL-MCNC: 164 MG/DL
CO2 SERPL-SCNC: 23 MMOL/L
CREAT SERPL-MCNC: 0.73 MG/DL
EGFRCR SERPLBLD CKD-EPI 2021: 107 ML/MIN/1.73M2
EOSINOPHIL # BLD AUTO: 0.11 K/UL
EOSINOPHIL NFR BLD AUTO: 2.3 %
ESTIMATED AVERAGE GLUCOSE: 105 MG/DL
GLUCOSE SERPL-MCNC: 114 MG/DL
HBA1C MFR BLD HPLC: 5.3 %
HCT VFR BLD CALC: 45.6 %
HDLC SERPL-MCNC: 30 MG/DL
HGB BLD-MCNC: 15.9 G/DL
IMM GRANULOCYTES NFR BLD AUTO: 0.2 %
LDLC SERPL-MCNC: 96 MG/DL
LYMPHOCYTES # BLD AUTO: 1.54 K/UL
LYMPHOCYTES NFR BLD AUTO: 31.9 %
MAN DIFF?: NORMAL
MCHC RBC-ENTMCNC: 31 PG
MCHC RBC-ENTMCNC: 34.9 G/DL
MCV RBC AUTO: 88.9 FL
MONOCYTES # BLD AUTO: 0.39 K/UL
MONOCYTES NFR BLD AUTO: 8.1 %
NEUTROPHILS # BLD AUTO: 2.76 K/UL
NEUTROPHILS NFR BLD AUTO: 57.1 %
NONHDLC SERPL-MCNC: 134 MG/DL
PLATELET # BLD AUTO: 264 K/UL
POTASSIUM SERPL-SCNC: 4.2 MMOL/L
PROT SERPL-MCNC: 7.1 G/DL
RBC # BLD: 5.13 M/UL
RBC # FLD: 13 %
SODIUM SERPL-SCNC: 142 MMOL/L
TRIGL SERPL-MCNC: 222 MG/DL
TSH SERPL-ACNC: 3.5 UIU/ML
WBC # FLD AUTO: 4.83 K/UL

## 2025-05-21 ENCOUNTER — OUTPATIENT (OUTPATIENT)
Dept: OUTPATIENT SERVICES | Facility: HOSPITAL | Age: 55
LOS: 1 days | End: 2025-05-21
Payer: SELF-PAY

## 2025-05-21 ENCOUNTER — APPOINTMENT (OUTPATIENT)
Age: 55
End: 2025-05-21

## 2025-05-21 VITALS
RESPIRATION RATE: 16 BRPM | OXYGEN SATURATION: 98 % | DIASTOLIC BLOOD PRESSURE: 84 MMHG | TEMPERATURE: 98 F | HEART RATE: 66 BPM | SYSTOLIC BLOOD PRESSURE: 132 MMHG

## 2025-05-21 DIAGNOSIS — E78.5 HYPERLIPIDEMIA, UNSPECIFIED: ICD-10-CM

## 2025-05-21 DIAGNOSIS — L29.9 PRURITUS, UNSPECIFIED: ICD-10-CM

## 2025-05-21 DIAGNOSIS — R74.8 ABNORMAL LEVELS OF OTHER SERUM ENZYMES: ICD-10-CM

## 2025-05-21 DIAGNOSIS — Z87.898 PERSONAL HISTORY OF OTHER SPECIFIED CONDITIONS: ICD-10-CM

## 2025-05-21 DIAGNOSIS — E66.3 OVERWEIGHT: ICD-10-CM

## 2025-05-21 DIAGNOSIS — E56.9 VITAMIN DEFICIENCY, UNSPECIFIED: ICD-10-CM

## 2025-05-21 DIAGNOSIS — K59.00 CONSTIPATION, UNSPECIFIED: ICD-10-CM

## 2025-05-21 DIAGNOSIS — Z90.49 ACQUIRED ABSENCE OF OTHER SPECIFIED PARTS OF DIGESTIVE TRACT: Chronic | ICD-10-CM

## 2025-05-21 DIAGNOSIS — Z00.00 ENCOUNTER FOR GENERAL ADULT MEDICAL EXAMINATION WITHOUT ABNORMAL FINDINGS: ICD-10-CM

## 2025-05-21 DIAGNOSIS — Z86.19 PERSONAL HISTORY OF OTHER INFECTIOUS AND PARASITIC DISEASES: ICD-10-CM

## 2025-05-21 DIAGNOSIS — R20.2 PARESTHESIA OF SKIN: ICD-10-CM

## 2025-05-21 DIAGNOSIS — Z13.0 ENCOUNTER FOR SCREENING FOR DISEASES OF THE BLOOD AND BLOOD-FORMING ORGANS AND CERTAIN DISORDERS INVOLVING THE IMMUNE MECHANISM: ICD-10-CM

## 2025-05-21 DIAGNOSIS — S86.302A UNSPECIFIED INJURY OF MUSCLE(S) AND TENDON(S) OF PERONEAL MUSCLE GROUP AT LOWER LEG LEVEL, LEFT LEG, INITIAL ENCOUNTER: ICD-10-CM

## 2025-05-21 DIAGNOSIS — Z00.00 ENCOUNTER FOR GENERAL ADULT MEDICAL EXAMINATION W/OUT ABNORMAL FINDINGS: ICD-10-CM

## 2025-05-21 DIAGNOSIS — Z86.39 PERSONAL HISTORY OF OTHER ENDOCRINE, NUTRITIONAL AND METABOLIC DISEASE: ICD-10-CM

## 2025-05-21 DIAGNOSIS — E66.811 OBESITY, CLASS 1: ICD-10-CM

## 2025-05-21 DIAGNOSIS — S84.12XA INJURY OF PERONEAL NERVE AT LOWER LEG LEVEL, LEFT LEG, INITIAL ENCOUNTER: ICD-10-CM

## 2025-05-21 PROCEDURE — G0463: CPT

## 2025-05-22 PROBLEM — Z00.00 ANNUAL PHYSICAL EXAM: Status: ACTIVE | Noted: 2025-05-21

## 2025-05-22 PROBLEM — Z13.0 SCREENING FOR DEFICIENCY ANEMIA: Status: RESOLVED | Noted: 2025-04-30 | Resolved: 2025-05-22

## 2025-06-25 ENCOUNTER — APPOINTMENT (OUTPATIENT)
Age: 55
End: 2025-06-25

## (undated) DEVICE — SYR LUER LOK 5CC

## (undated) DEVICE — FOLEY HOLDER STATLOCK 2 WAY ADULT

## (undated) DEVICE — CATH IV SAFE BC 20G X 1.16" (PINK)

## (undated) DEVICE — TUBING SUCTION CONN 6FT STERILE

## (undated) DEVICE — BRUSH COLONOSCOPY CYTOLOGY

## (undated) DEVICE — ELCTR GROUNDING PAD ADULT COVIDIEN

## (undated) DEVICE — SYR LUER LOK 20CC

## (undated) DEVICE — CATH IV SAFE BC 22G X 1" (BLUE)

## (undated) DEVICE — SNARE EXACTO COLD 9MMX230CM

## (undated) DEVICE — BIOPSY FORCEP RADIAL JAW 4 STANDARD WITH NEEDLE

## (undated) DEVICE — IRRIGATOR BIO SHIELD

## (undated) DEVICE — POLY TRAP ETRAP

## (undated) DEVICE — TUBING IV SET GRAVITY 3Y 100" MACRO

## (undated) DEVICE — SOL INJ NS 0.9% 500ML 2 PORT

## (undated) DEVICE — FORCEP RADIAL JAW 4 JUMBO 2.8MM 3.2MM 240CM ORANGE DISP

## (undated) DEVICE — SYR LUER LOK 50CC

## (undated) DEVICE — PACK IV START WITH CHG

## (undated) DEVICE — SUCTION YANKAUER NO CONTROL VENT

## (undated) DEVICE — TUBING SUCTION 20FT

## (undated) DEVICE — SENSOR O2 FINGER ADULT 24/CA

## (undated) DEVICE — CLAMP BX HOT RAD JAW 3